# Patient Record
Sex: MALE | Race: WHITE | NOT HISPANIC OR LATINO | Employment: FULL TIME | URBAN - METROPOLITAN AREA
[De-identification: names, ages, dates, MRNs, and addresses within clinical notes are randomized per-mention and may not be internally consistent; named-entity substitution may affect disease eponyms.]

---

## 2017-03-06 ENCOUNTER — GENERIC CONVERSION - ENCOUNTER (OUTPATIENT)
Dept: OTHER | Facility: OTHER | Age: 52
End: 2017-03-06

## 2017-03-06 DIAGNOSIS — I10 ESSENTIAL (PRIMARY) HYPERTENSION: ICD-10-CM

## 2017-03-06 DIAGNOSIS — M10.9 GOUT: ICD-10-CM

## 2017-10-23 ENCOUNTER — ALLSCRIPTS OFFICE VISIT (OUTPATIENT)
Dept: OTHER | Facility: OTHER | Age: 52
End: 2017-10-23

## 2017-10-23 DIAGNOSIS — M25.531 PAIN IN RIGHT WRIST: ICD-10-CM

## 2017-10-23 DIAGNOSIS — I10 ESSENTIAL (PRIMARY) HYPERTENSION: ICD-10-CM

## 2017-10-23 DIAGNOSIS — E78.5 HYPERLIPIDEMIA: ICD-10-CM

## 2017-10-23 DIAGNOSIS — Z12.5 ENCOUNTER FOR SCREENING FOR MALIGNANT NEOPLASM OF PROSTATE: ICD-10-CM

## 2017-10-23 DIAGNOSIS — M10.9 GOUT: ICD-10-CM

## 2017-10-24 NOTE — PROGRESS NOTES
Assessment  1  Never a smoker  2  Prostate cancer screening (V76 44) (Z12 5)  3  Tendinitis of left rotator cuff (726 10) (M75 82)  4  Hyperlipidemia (272 4) (E78 5)  5  Hypertension (401 9) (I10)  6  Gout (274 9) (M10 9)  7  Right wrist pain (719 43) (M25 531)  8  Never a smoker    Plan  Gout    · (1) URIC ACID; Status:Active; Requested MZU:[id number];   Hyperlipidemia    · (1) LIPID PANEL, FASTING; Status:Active; Requested JASON:[id number];   Hypertension    · (1) COMPREHENSIVE METABOLIC PANEL; Status:Active; Requested XZS:[id number];   Prostate cancer screening    · (1) PSA (SCREEN) (Dx V76 44 Screen for Prostate Cancer); Status:Active; Requested  RXU:00KUT2444;   Right wrist pain    · * XR WRIST 3+ VIEW RIGHT; Status:Active; Requested KFF:92NUP9103;     Discussion/Summary    The patient appears to have rotator cuff tendinitis and I would not be surprised if he has something such as a labral tear of his left shoulder based on the amount of pain that he is having  After discussing risks and benefits, I injected 1 mL of 2% lidocaine and 0 5 mL of Kenalog 40 mg spinal  He tolerated the procedure well  It was done under aseptic conditions utilizing a posterior approach would like him to have some fasting labs done  He also complains of pain in his right wrist  Exam is relatively normal except for some pain with full flexion extension  Check an x-ray of the wrist       Chief Complaint  He is in today for left shoulder injection      History of Present Illness  Patient presents today for a follow-up for ongoing pain in his left shoulder  He [facility] years ago with some relief  The shoulder is quite painful with any type of abduction activities or if he is trying to do something such as riding a bike  He tries to avoid anti-inflammatories, as they have given him intermittent gastritis symptoms  has a history of hypertension and denies any current problems with chest pain, shortness of breath or palpitations  Review of Systems    Cardiovascular: no chest pain-- and-- no palpitations  Active Problems  1  Arthralgia (719 40) (M25 50)  2  Colon cancer screening (V76 51) (Z12 11)  3  Flu vaccine need (V04 81) (Z23)  4  Gout (274 9) (M10 9)  5  Hyperlipidemia (272 4) (E78 5)  6  Hypertension (401 9) (I10)  7  Tendinitis of left rotator cuff (726 10) (M75 82)    Past Medical History  1  History of Acute upper respiratory infection (465 9) (J06 9)  2  History of allergic reaction (V15 09) (Z88 9)  3  History of urticaria (V13 3) (Z87 2)  4  History of Muscle ache (729 1) (M79 1)    The active problems and past medical history were reviewed and updated today  Family History  Mother   1  Family history of hyperlipidemia (V18 19) (Z83 49)    The family history was reviewed and updated today  Social History   · Never a smoker   · Never a smoker  The social history was reviewed and updated today  Current Meds  1  Cyclobenzaprine HCl - 10 MG Oral Tablet; TAKE 1 TABLET AT BEDTIME AS NEEDED; Therapy: 37ZJP4994 to (467 20 767)  Requested for: 88YXS1289; Last   Rx:06Mar2017 Ordered  2  Lisinopril-Hydrochlorothiazide 10-12 5 MG Oral Tablet; TAKE 1 TABLET DAILY; Therapy: 87Ohy8980 to (Hortencia Freshwater)  Requested for: 07GUB8625; Last   Rx:06Mar2017 Ordered  3  Nabumetone 750 MG Oral Tablet; TAKE 1 TABLET TWICE DAILY AS NEEDED; Therapy: 26BZD9867 to (Evaluate:82Kjh8289)  Requested for: 95HMX6363; Last   Rx:32Wxx6612 Ordered    Allergies  1  No Known Drug Allergies    Vitals  Vital Signs    Recorded: 47CYB7296 12:29PM   Heart Rate 72   Systolic 510   Diastolic 80   Height 6 ft 9 8 in   Weight 185 lb 6 oz   BMI Calculated 19 48   BSA Calculated 2 26     Physical Exam    Constitutional   General appearance: No acute distress, well appearing and well nourished  Pulmonary   Respiratory effort: No increased work of breathing or signs of respiratory distress  Auscultation of lungs: Clear to auscultation, equal breath sounds bilaterally, no wheezes, no rales, no rhonci  Cardiovascular   Auscultation of heart: Normal rate and rhythm, normal S1 and S2, without murmurs  Examination of extremities for edema and/or varicosities: Normal          Results/Data  PHQ-2 Adult Depression Screening 23Oct2017 01:01PM User, Waldo     Test Name Result Flag Reference   PHQ-2 Adult Depression Score 0     Over the last two weeks, how often have you been bothered by any of the following problems? Little interest or pleasure in doing things: Not at all - 0  Feeling down, depressed, or hopeless: Not at all - 0   PHQ-2 Adult Depression Screening Negative         Health Management  Colon cancer screening   COLONOSCOPY (GI, SURG); every 10 years; Last 52XLU8557; Next Due: 21CWX5382;  Active    Signatures   Electronically signed by : RICARDO Miller ; Oct 23 2017  1:07PM EST                       (Author)    Electronically signed by : RICARDO Miller ; Oct 24 2017 11:54AM EST

## 2018-01-13 VITALS — DIASTOLIC BLOOD PRESSURE: 94 MMHG | SYSTOLIC BLOOD PRESSURE: 157 MMHG

## 2018-01-14 VITALS
WEIGHT: 185.38 LBS | BODY MASS INDEX: 21.45 KG/M2 | HEIGHT: 78 IN | SYSTOLIC BLOOD PRESSURE: 130 MMHG | DIASTOLIC BLOOD PRESSURE: 80 MMHG | HEART RATE: 72 BPM

## 2018-01-15 NOTE — PROGRESS NOTES
Assessment    1  Acute upper respiratory infection (465 9) (J06 9)   2  Allergic reaction (995 3) (T78 40XA)   3  Hives (708 9) (L50 9)   4  Hypertension (401 9) (I10)    Plan  Acute upper respiratory infection    · Benzonatate 200 MG Oral Capsule; TAKE 1 CAPSULE 3 TIMES DAILY AS  NEEDED  Allergic reaction    · Kenalog 40 MG/ML Injection Suspension (Triamcinolone Acetonide)  Allergic reaction, Hives    · PredniSONE 10 MG Oral Tablet; Take 5 pills daily for 2 days, 4 pills daily for 2 days,  3 pills daily for 2 days, 2 pills daily for 2 days, then 1 pill daily for 2 days  Hypertension    · AmLODIPine Besylate 10 MG Oral Tablet; TAKE 1 TABLET DAILY FOR BLOOD  PRESSURE    Discussion/Summary    The patient will use benzonatate as needed for cough  We discussed that it appears he has not respiratory infection which is likely viral as well as a rash which could be allergic reaction to an unknown source  He will restart a prednisone taper as above  He was also given an injection of Kenalog in the office given his lip swelling and tingling  He was encouraged to use Benadryl as needed for the itching  We reviewed that if he felt that the symptoms started to involve throat swelling or shortness of breath, he should call 911 and go to the emergency room immediately  He should call if his symptoms fail to improve  Chief Complaint  c/o ear pressure, coughing and hives      History of Present Illness  Cold Symptoms:   Em Downey presents with complaints of cold symptoms starting 2 days ago (just finished prednisone for his gout yesterday - denies trouble with allergies - notes that his lips feel a little tingling and swollen)   Associated symptoms include nasal congestion, runny nose, post nasal drainage, dry cough, facial pressure and plugged ear(s), but no ear pain, no wheezing, no nausea, no vomiting and no diarrhea     The patient presents with complaints of sore throat (resolved but began this morning)   The patient presents with complaints of shortness of breath (and chest tightness )   The patient presents with complaints of fever (woke up drenched the last 2 nights )      Review of Systems    Constitutional: fever  ENT: sore throat and nasal discharge, but no earache  Respiratory: shortness of breath and cough, but no wheezing  Gastrointestinal: no nausea, no vomiting and no diarrhea  Neurological: headache  Active Problems    1  Gout (274 9) (M10 9)    Current Meds   1  Lisinopril-Hydrochlorothiazide 10-12 5 MG Oral Tablet; Take 1 tablet daily; Therapy: 21Jan2016 to (Evaluate:66Zpr2132) Recorded   2  Nabumetone 750 MG Oral Tablet; TAKE 1 TABLET BY MOUTH TWICE A DAY AS   NEEDED FOR PAIN;   Therapy: 60FBN5945 to (Kirill Brice)  Requested for: 00BTL5067; Last   Rx:05Kfs5850 Ordered    Vitals   ** Printed in Appendix #1 below  Physical Exam    Constitutional   General appearance: No acute distress, well appearing and well nourished  Head and Face   Head and face: Normal     Eyes   Conjunctiva and lids: No erythema, swelling or discharge  Pupils and irises: Equal, round, reactive to light  Ears, Nose, Mouth, and Throat   External inspection of ears and nose: Normal     Otoscopic examination: Tympanic membranes translucent with normal light reflex  Canals patent without erythema  Hearing: Normal     Nasal mucosa, septum, and turbinates: Normal without edema or erythema  Lips, teeth, and gums: Normal, good dentition  Oropharynx: Normal with no erythema, edema, exudate or lesions  Neck   Neck: Supple, symmetric, trachea midline, no masses  Thyroid: Normal, no thyromegaly  Pulmonary   Respiratory effort: Abnormal   Respiratory rate: normal  Assessment of respiratory effort revealed normal rhythm and effort  Respiratory Findings: dry cough and bronchial cough  Auscultation of lungs: Clear to auscultation      Cardiovascular   Auscultation of heart: Normal rate and rhythm, normal S1 and S2, no murmurs  Peripheral vascular exam: Normal   Radial: right 2+ and left 2+  Lymphatic   Palpation of lymph nodes in neck: Abnormal   right non-tender anterior cervical node enlargement, but no posterior cervical node enlargement and no submandibular node enlargement  Skin   Skin and subcutaneous tissue: Abnormal   scattered urticaria on the upper extremities bilaterally and anterior waistline  Psychiatric   Mood and affect: Normal        Attending Note  Attending Note [facility]: Comments/Additional Findings: I did end up switching the patient to amlodipine 10 mg daily instead lisinopril/hydrochlorothiazide in case he was experiencing angioedema  I spoke with him 16 and he is doing much better        Signatures   Electronically signed by : JANEL River; 2016 11:08PM EST                       (Author)    Electronically signed by : RICARDO Thurman ; 2016  4:30PM EST                       (Author)    Appendix #1     Patient: Juan Francisco Gatica ; : 1965; MRN: [id number]      Recorded: 28JWY3402 06:28PM Recorded: 03XZV2982 06:09PM Recorded: 87IAI9991 05:55PM   Temperature   98 2 F   Heart Rate   797   Systolic 008 594 427   Diastolic 92 90 397   Height   6 ft 9 8 in   Weight   189 lb 8 0 oz   BMI Calculated   19 91   BSA Calculated   2 29   O2 Saturation   98

## 2018-01-17 NOTE — MISCELLANEOUS
Message   Recorded as Task   Date: 03/08/2016 06:54 PM, Created By: Radha Whitley   Task Name: Follow Up   Assigned To: Radha Whitley   Regarding Patient: Robert Rai, Status: Active   Comment:    Radha Whitley - 08 Mar 2016 6:54 PM     TASK CREATED  DMD requested that I call in med to [facility] for flexaril 10mg 1 qhs prn 30 and 1 r, this was done  Active Problems    1  Acute upper respiratory infection (465 9) (J06 9)   2  Allergic reaction (995 3) (T78 40XA)   3  Gout (274 9) (M10 9)   4  Hives (708 9) (L50 9)   5  Hypertension (401 9) (I10)   6  Muscle ache (729 1) (M79 1)    Current Meds   1  AmLODIPine Besylate 10 MG Oral Tablet; TAKE 1 TABLET DAILY FOR BLOOD   PRESSURE; Therapy: 69TVK1766 to (Evaluate:89Etm6335)  Requested for: 68CIC5882; Last   Rx:22Jan2016 Ordered   2  Cyclobenzaprine HCl - 10 MG Oral Tablet; TAKE 1 TABLET AT BEDTIME AS NEEDED; Therapy: 15BYT6538 to (Walker Bui) Recorded   3  Nabumetone 750 MG Oral Tablet; TAKE 1 TABLET BY MOUTH TWICE A DAY AS   NEEDED FOR PAIN;   Therapy: 66RYX6398 to (Conchetta Pop)  Requested for: 14AQT9708; Last   Rx:36Pno9801 Ordered    Allergies    1   No Known Drug Allergies    Signatures   Electronically signed by : Renae Bess, ; Mar  8 2016  6:54PM EST                       (Author)

## 2018-01-26 DIAGNOSIS — N52.9 ERECTILE DYSFUNCTION, UNSPECIFIED ERECTILE DYSFUNCTION TYPE: Primary | ICD-10-CM

## 2018-01-26 RX ORDER — SILDENAFIL 100 MG/1
100 TABLET, FILM COATED ORAL DAILY PRN
Qty: 6 TABLET | Refills: 0 | Status: SHIPPED | OUTPATIENT
Start: 2018-01-26 | End: 2018-04-19 | Stop reason: SDUPTHER

## 2018-03-18 DIAGNOSIS — I10 ESSENTIAL HYPERTENSION: Primary | ICD-10-CM

## 2018-03-19 RX ORDER — LISINOPRIL AND HYDROCHLOROTHIAZIDE 12.5; 1 MG/1; MG/1
TABLET ORAL
Qty: 90 TABLET | Refills: 3 | Status: SHIPPED | OUTPATIENT
Start: 2018-03-19 | End: 2018-12-26

## 2018-04-19 DIAGNOSIS — N52.9 ERECTILE DYSFUNCTION, UNSPECIFIED ERECTILE DYSFUNCTION TYPE: ICD-10-CM

## 2018-04-19 RX ORDER — SILDENAFIL 100 MG/1
100 TABLET, FILM COATED ORAL DAILY PRN
Qty: 6 TABLET | Refills: 0 | Status: SHIPPED | OUTPATIENT
Start: 2018-04-19 | End: 2018-11-20 | Stop reason: SDUPTHER

## 2018-11-20 DIAGNOSIS — N52.9 ERECTILE DYSFUNCTION, UNSPECIFIED ERECTILE DYSFUNCTION TYPE: ICD-10-CM

## 2018-11-20 RX ORDER — SILDENAFIL 100 MG/1
100 TABLET, FILM COATED ORAL DAILY PRN
Qty: 6 TABLET | Refills: 3 | Status: SHIPPED | OUTPATIENT
Start: 2018-11-20 | End: 2020-07-31 | Stop reason: SDUPTHER

## 2018-12-26 DIAGNOSIS — R45.4 ANGER REACTION: Primary | ICD-10-CM

## 2018-12-26 RX ORDER — ESCITALOPRAM OXALATE 10 MG/1
10 TABLET ORAL DAILY
Qty: 90 TABLET | Refills: 1 | Status: SHIPPED | OUTPATIENT
Start: 2018-12-26 | End: 2019-04-05

## 2018-12-26 NOTE — PROGRESS NOTES
Pt experiencing some increased anger during holidays regarding his divorce etc  Will try lexapro 10 mg QD  He needs a follow up in the office as well, which was discussed

## 2019-04-02 DIAGNOSIS — I10 ESSENTIAL HYPERTENSION: Primary | ICD-10-CM

## 2019-04-02 RX ORDER — LISINOPRIL AND HYDROCHLOROTHIAZIDE 12.5; 1 MG/1; MG/1
TABLET ORAL
Qty: 90 TABLET | Refills: 1 | Status: SHIPPED | OUTPATIENT
Start: 2019-04-02 | End: 2019-05-31 | Stop reason: SDUPTHER

## 2019-04-05 ENCOUNTER — OFFICE VISIT (OUTPATIENT)
Dept: FAMILY MEDICINE CLINIC | Facility: CLINIC | Age: 54
End: 2019-04-05
Payer: COMMERCIAL

## 2019-04-05 ENCOUNTER — APPOINTMENT (OUTPATIENT)
Dept: LAB | Facility: CLINIC | Age: 54
End: 2019-04-05
Payer: COMMERCIAL

## 2019-04-05 VITALS
WEIGHT: 203.6 LBS | SYSTOLIC BLOOD PRESSURE: 138 MMHG | BODY MASS INDEX: 28.5 KG/M2 | OXYGEN SATURATION: 97 % | TEMPERATURE: 97.4 F | DIASTOLIC BLOOD PRESSURE: 86 MMHG | HEIGHT: 71 IN | HEART RATE: 68 BPM | RESPIRATION RATE: 18 BRPM

## 2019-04-05 DIAGNOSIS — Z12.5 PROSTATE CANCER SCREENING: ICD-10-CM

## 2019-04-05 DIAGNOSIS — E78.00 PURE HYPERCHOLESTEROLEMIA: ICD-10-CM

## 2019-04-05 DIAGNOSIS — I10 ESSENTIAL HYPERTENSION: ICD-10-CM

## 2019-04-05 DIAGNOSIS — R53.83 OTHER FATIGUE: ICD-10-CM

## 2019-04-05 DIAGNOSIS — N52.9 ERECTILE DYSFUNCTION, UNSPECIFIED ERECTILE DYSFUNCTION TYPE: ICD-10-CM

## 2019-04-05 DIAGNOSIS — M10.00 IDIOPATHIC GOUT, UNSPECIFIED CHRONICITY, UNSPECIFIED SITE: ICD-10-CM

## 2019-04-05 DIAGNOSIS — N52.9 ERECTILE DYSFUNCTION, UNSPECIFIED ERECTILE DYSFUNCTION TYPE: Primary | ICD-10-CM

## 2019-04-05 PROBLEM — E78.5 HYPERLIPIDEMIA: Status: ACTIVE | Noted: 2017-03-06

## 2019-04-05 PROBLEM — M75.82 TENDINITIS OF LEFT ROTATOR CUFF: Status: ACTIVE | Noted: 2017-03-06

## 2019-04-05 LAB
ALBUMIN SERPL BCP-MCNC: 4.3 G/DL (ref 3.5–5)
ALP SERPL-CCNC: 71 U/L (ref 46–116)
ALT SERPL W P-5'-P-CCNC: 38 U/L (ref 12–78)
ANION GAP SERPL CALCULATED.3IONS-SCNC: 5 MMOL/L (ref 4–13)
AST SERPL W P-5'-P-CCNC: 23 U/L (ref 5–45)
BASOPHILS # BLD AUTO: 0.06 THOUSANDS/ΜL (ref 0–0.1)
BASOPHILS NFR BLD AUTO: 1 % (ref 0–1)
BILIRUB SERPL-MCNC: 0.67 MG/DL (ref 0.2–1)
BUN SERPL-MCNC: 24 MG/DL (ref 5–25)
CALCIUM SERPL-MCNC: 9.5 MG/DL (ref 8.3–10.1)
CHLORIDE SERPL-SCNC: 103 MMOL/L (ref 100–108)
CHOLEST SERPL-MCNC: 282 MG/DL (ref 50–200)
CO2 SERPL-SCNC: 30 MMOL/L (ref 21–32)
CREAT SERPL-MCNC: 1.28 MG/DL (ref 0.6–1.3)
EOSINOPHIL # BLD AUTO: 0.2 THOUSAND/ΜL (ref 0–0.61)
EOSINOPHIL NFR BLD AUTO: 3 % (ref 0–6)
ERYTHROCYTE [DISTWIDTH] IN BLOOD BY AUTOMATED COUNT: 12.7 % (ref 11.6–15.1)
GFR SERPL CREATININE-BSD FRML MDRD: 63 ML/MIN/1.73SQ M
GLUCOSE P FAST SERPL-MCNC: 105 MG/DL (ref 65–99)
HCT VFR BLD AUTO: 50.8 % (ref 36.5–49.3)
HDLC SERPL-MCNC: 39 MG/DL (ref 40–60)
HGB BLD-MCNC: 16.8 G/DL (ref 12–17)
IMM GRANULOCYTES # BLD AUTO: 0.04 THOUSAND/UL (ref 0–0.2)
IMM GRANULOCYTES NFR BLD AUTO: 1 % (ref 0–2)
LDLC SERPL CALC-MCNC: 202 MG/DL (ref 0–100)
LYMPHOCYTES # BLD AUTO: 2.05 THOUSANDS/ΜL (ref 0.6–4.47)
LYMPHOCYTES NFR BLD AUTO: 29 % (ref 14–44)
MCH RBC QN AUTO: 29.7 PG (ref 26.8–34.3)
MCHC RBC AUTO-ENTMCNC: 33.1 G/DL (ref 31.4–37.4)
MCV RBC AUTO: 90 FL (ref 82–98)
MONOCYTES # BLD AUTO: 1 THOUSAND/ΜL (ref 0.17–1.22)
MONOCYTES NFR BLD AUTO: 14 % (ref 4–12)
NEUTROPHILS # BLD AUTO: 3.65 THOUSANDS/ΜL (ref 1.85–7.62)
NEUTS SEG NFR BLD AUTO: 52 % (ref 43–75)
NRBC BLD AUTO-RTO: 0 /100 WBCS
PLATELET # BLD AUTO: 254 THOUSANDS/UL (ref 149–390)
PMV BLD AUTO: 10.1 FL (ref 8.9–12.7)
POTASSIUM SERPL-SCNC: 4.1 MMOL/L (ref 3.5–5.3)
PROT SERPL-MCNC: 8 G/DL (ref 6.4–8.2)
PSA SERPL-MCNC: 2.1 NG/ML (ref 0–4)
RBC # BLD AUTO: 5.66 MILLION/UL (ref 3.88–5.62)
SODIUM SERPL-SCNC: 138 MMOL/L (ref 136–145)
TESTOST SERPL-MCNC: 554 NG/DL (ref 95–948)
TRIGL SERPL-MCNC: 207 MG/DL
TSH SERPL DL<=0.05 MIU/L-ACNC: 3.55 UIU/ML (ref 0.36–3.74)
URATE SERPL-MCNC: 8.3 MG/DL (ref 4.2–8)
WBC # BLD AUTO: 7 THOUSAND/UL (ref 4.31–10.16)

## 2019-04-05 PROCEDURE — 3075F SYST BP GE 130 - 139MM HG: CPT | Performed by: FAMILY MEDICINE

## 2019-04-05 PROCEDURE — 3008F BODY MASS INDEX DOCD: CPT | Performed by: FAMILY MEDICINE

## 2019-04-05 PROCEDURE — 99213 OFFICE O/P EST LOW 20 MIN: CPT | Performed by: FAMILY MEDICINE

## 2019-04-05 PROCEDURE — 85025 COMPLETE CBC W/AUTO DIFF WBC: CPT

## 2019-04-05 PROCEDURE — 84443 ASSAY THYROID STIM HORMONE: CPT

## 2019-04-05 PROCEDURE — 3079F DIAST BP 80-89 MM HG: CPT | Performed by: FAMILY MEDICINE

## 2019-04-05 PROCEDURE — G0103 PSA SCREENING: HCPCS

## 2019-04-05 PROCEDURE — 84403 ASSAY OF TOTAL TESTOSTERONE: CPT

## 2019-04-05 PROCEDURE — 80061 LIPID PANEL: CPT

## 2019-04-05 PROCEDURE — 80053 COMPREHEN METABOLIC PANEL: CPT

## 2019-04-05 PROCEDURE — 84550 ASSAY OF BLOOD/URIC ACID: CPT

## 2019-04-05 PROCEDURE — 36415 COLL VENOUS BLD VENIPUNCTURE: CPT

## 2019-04-08 DIAGNOSIS — E78.00 PURE HYPERCHOLESTEROLEMIA: Primary | ICD-10-CM

## 2019-04-08 RX ORDER — ATORVASTATIN CALCIUM 20 MG/1
20 TABLET, FILM COATED ORAL DAILY
Qty: 90 TABLET | Refills: 3 | Status: SHIPPED | OUTPATIENT
Start: 2019-04-08 | End: 2020-04-24

## 2019-05-31 ENCOUNTER — OFFICE VISIT (OUTPATIENT)
Dept: FAMILY MEDICINE CLINIC | Facility: CLINIC | Age: 54
End: 2019-05-31
Payer: COMMERCIAL

## 2019-05-31 ENCOUNTER — TRANSCRIBE ORDERS (OUTPATIENT)
Dept: ADMINISTRATIVE | Facility: HOSPITAL | Age: 54
End: 2019-05-31

## 2019-05-31 ENCOUNTER — HOSPITAL ENCOUNTER (OUTPATIENT)
Dept: ULTRASOUND IMAGING | Facility: HOSPITAL | Age: 54
Discharge: HOME | End: 2019-05-31
Payer: COMMERCIAL

## 2019-05-31 ENCOUNTER — APPOINTMENT (OUTPATIENT)
Dept: LAB | Facility: HOSPITAL | Age: 54
End: 2019-05-31
Payer: COMMERCIAL

## 2019-05-31 VITALS
WEIGHT: 205 LBS | HEART RATE: 70 BPM | DIASTOLIC BLOOD PRESSURE: 82 MMHG | SYSTOLIC BLOOD PRESSURE: 120 MMHG | BODY MASS INDEX: 28.7 KG/M2 | HEIGHT: 71 IN

## 2019-05-31 DIAGNOSIS — R10.11 RUQ PAIN: ICD-10-CM

## 2019-05-31 DIAGNOSIS — R31.0 GROSS HEMATURIA: ICD-10-CM

## 2019-05-31 DIAGNOSIS — R10.11 RUQ PAIN: Primary | ICD-10-CM

## 2019-05-31 DIAGNOSIS — E66.3 OVERWEIGHT WITH BODY MASS INDEX (BMI) 25.0-29.9: ICD-10-CM

## 2019-05-31 DIAGNOSIS — I10 ESSENTIAL HYPERTENSION: ICD-10-CM

## 2019-05-31 LAB
ALBUMIN SERPL BCP-MCNC: 4.7 G/DL (ref 3–5.2)
ALP SERPL-CCNC: 65 U/L (ref 43–122)
ALT SERPL W P-5'-P-CCNC: 52 U/L (ref 9–52)
AMYLASE SERPL-CCNC: 76 IU/L (ref 30–110)
ANION GAP SERPL CALCULATED.3IONS-SCNC: 8 MMOL/L (ref 5–14)
AST SERPL W P-5'-P-CCNC: 38 U/L (ref 17–59)
BILIRUB SERPL-MCNC: 0.8 MG/DL
BUN SERPL-MCNC: 21 MG/DL (ref 5–25)
CALCIUM ALBUM COR SERPL-MCNC: 9.9 MG/DL (ref 8.3–10.1)
CALCIUM SERPL-MCNC: 10.5 MG/DL (ref 8.4–10.2)
CHLORIDE SERPL-SCNC: 100 MMOL/L (ref 97–108)
CO2 SERPL-SCNC: 30 MMOL/L (ref 22–30)
CREAT SERPL-MCNC: 1.09 MG/DL (ref 0.7–1.5)
EOSINOPHIL # BLD AUTO: 0.12 THOUSAND/UL (ref 0–0.4)
EOSINOPHIL NFR BLD MANUAL: 2 % (ref 0–6)
ERYTHROCYTE [DISTWIDTH] IN BLOOD BY AUTOMATED COUNT: 12.9 %
GFR SERPL CREATININE-BSD FRML MDRD: 76 ML/MIN/1.73SQ M
GLUCOSE P FAST SERPL-MCNC: 99 MG/DL (ref 70–99)
HCT VFR BLD AUTO: 45.1 % (ref 41–53)
HGB BLD-MCNC: 15.5 G/DL (ref 13.5–17.5)
LYMPHOCYTES # BLD AUTO: 1.95 THOUSAND/UL (ref 0.5–4)
LYMPHOCYTES # BLD AUTO: 32 % (ref 25–45)
MCH RBC QN AUTO: 30.1 PG (ref 26–34)
MCHC RBC AUTO-ENTMCNC: 34.5 G/DL (ref 31–36)
MCV RBC AUTO: 87 FL (ref 80–100)
MONOCYTES # BLD AUTO: 0.85 THOUSAND/UL (ref 0.2–0.9)
MONOCYTES NFR BLD AUTO: 14 % (ref 1–10)
NEUTS SEG # BLD: 3.17 THOUSAND/UL (ref 1.8–7.8)
NEUTS SEG NFR BLD AUTO: 52 %
PLATELET # BLD AUTO: 239 THOUSANDS/UL (ref 150–450)
PLATELET BLD QL SMEAR: ADEQUATE
PMV BLD AUTO: 8 FL (ref 8.9–12.7)
POTASSIUM SERPL-SCNC: 4.3 MMOL/L (ref 3.6–5)
PROT SERPL-MCNC: 7.5 G/DL (ref 5.9–8.4)
RBC # BLD AUTO: 5.17 MILLION/UL (ref 4.5–5.9)
RBC MORPH BLD: NORMAL
SL AMB  POCT GLUCOSE, UA: NEGATIVE
SL AMB LEUKOCYTE ESTERASE,UA: NEGATIVE
SL AMB POCT BILIRUBIN,UA: NEGATIVE
SL AMB POCT BLOOD,UA: NEGATIVE
SL AMB POCT CLARITY,UA: CLEAR
SL AMB POCT COLOR,UA: YELLOW
SL AMB POCT KETONES,UA: NEGATIVE
SL AMB POCT NITRITE,UA: NEGATIVE
SL AMB POCT PH,UA: 5
SL AMB POCT SPECIFIC GRAVITY,UA: 1.02
SL AMB POCT URINE PROTEIN: NEGATIVE
SL AMB POCT UROBILINOGEN: 0.2
SODIUM SERPL-SCNC: 138 MMOL/L (ref 137–147)
TOTAL CELLS COUNTED SPEC: 100
WBC # BLD AUTO: 6.1 THOUSAND/UL (ref 4.5–11)

## 2019-05-31 PROCEDURE — 76700 US EXAM ABDOM COMPLETE: CPT

## 2019-05-31 PROCEDURE — 82150 ASSAY OF AMYLASE: CPT

## 2019-05-31 PROCEDURE — 3008F BODY MASS INDEX DOCD: CPT | Performed by: FAMILY MEDICINE

## 2019-05-31 PROCEDURE — 99213 OFFICE O/P EST LOW 20 MIN: CPT | Performed by: FAMILY MEDICINE

## 2019-05-31 PROCEDURE — 81002 URINALYSIS NONAUTO W/O SCOPE: CPT | Performed by: FAMILY MEDICINE

## 2019-05-31 PROCEDURE — 85027 COMPLETE CBC AUTOMATED: CPT

## 2019-05-31 PROCEDURE — 3074F SYST BP LT 130 MM HG: CPT | Performed by: FAMILY MEDICINE

## 2019-05-31 PROCEDURE — 36415 COLL VENOUS BLD VENIPUNCTURE: CPT

## 2019-05-31 PROCEDURE — 80053 COMPREHEN METABOLIC PANEL: CPT

## 2019-05-31 PROCEDURE — 85007 BL SMEAR W/DIFF WBC COUNT: CPT

## 2019-05-31 PROCEDURE — 3079F DIAST BP 80-89 MM HG: CPT | Performed by: FAMILY MEDICINE

## 2019-05-31 RX ORDER — LISINOPRIL AND HYDROCHLOROTHIAZIDE 12.5; 1 MG/1; MG/1
1 TABLET ORAL DAILY
Qty: 90 TABLET | Refills: 3 | Status: SHIPPED | OUTPATIENT
Start: 2019-05-31 | End: 2020-04-16 | Stop reason: SINTOL

## 2019-06-13 ENCOUNTER — CONSULT (OUTPATIENT)
Dept: UROLOGY | Facility: MEDICAL CENTER | Age: 54
End: 2019-06-13
Payer: COMMERCIAL

## 2019-06-13 VITALS
HEIGHT: 70 IN | HEART RATE: 74 BPM | SYSTOLIC BLOOD PRESSURE: 120 MMHG | BODY MASS INDEX: 27.92 KG/M2 | WEIGHT: 195 LBS | DIASTOLIC BLOOD PRESSURE: 74 MMHG

## 2019-06-13 DIAGNOSIS — R36.9: ICD-10-CM

## 2019-06-13 DIAGNOSIS — N40.1 BPH WITH OBSTRUCTION/LOWER URINARY TRACT SYMPTOMS: ICD-10-CM

## 2019-06-13 DIAGNOSIS — R31.0 GROSS HEMATURIA: Primary | ICD-10-CM

## 2019-06-13 DIAGNOSIS — N20.0 CALCULUS OF KIDNEY: ICD-10-CM

## 2019-06-13 DIAGNOSIS — N13.8 BPH WITH OBSTRUCTION/LOWER URINARY TRACT SYMPTOMS: ICD-10-CM

## 2019-06-13 LAB
SL AMB  POCT GLUCOSE, UA: NEGATIVE
SL AMB LEUKOCYTE ESTERASE,UA: NEGATIVE
SL AMB POCT BILIRUBIN,UA: NEGATIVE
SL AMB POCT BLOOD,UA: NEGATIVE
SL AMB POCT CLARITY,UA: CLEAR
SL AMB POCT COLOR,UA: YELLOW
SL AMB POCT KETONES,UA: NEGATIVE
SL AMB POCT NITRITE,UA: NEGATIVE
SL AMB POCT PH,UA: 5.5
SL AMB POCT SPECIFIC GRAVITY,UA: 1.02
SL AMB POCT URINE PROTEIN: NEGATIVE
SL AMB POCT UROBILINOGEN: 0.2

## 2019-06-13 PROCEDURE — 81003 URINALYSIS AUTO W/O SCOPE: CPT | Performed by: UROLOGY

## 2019-06-13 PROCEDURE — 99203 OFFICE O/P NEW LOW 30 MIN: CPT | Performed by: UROLOGY

## 2019-07-12 ENCOUNTER — OFFICE VISIT (OUTPATIENT)
Dept: FAMILY MEDICINE CLINIC | Facility: CLINIC | Age: 54
End: 2019-07-12
Payer: COMMERCIAL

## 2019-07-12 VITALS
WEIGHT: 206 LBS | HEART RATE: 76 BPM | OXYGEN SATURATION: 97 % | HEIGHT: 70 IN | DIASTOLIC BLOOD PRESSURE: 80 MMHG | RESPIRATION RATE: 18 BRPM | SYSTOLIC BLOOD PRESSURE: 124 MMHG | BODY MASS INDEX: 29.49 KG/M2

## 2019-07-12 DIAGNOSIS — I10 ESSENTIAL HYPERTENSION: Primary | ICD-10-CM

## 2019-07-12 DIAGNOSIS — E66.3 OVERWEIGHT WITH BODY MASS INDEX (BMI) 25.0-29.9: ICD-10-CM

## 2019-07-12 DIAGNOSIS — E78.00 PURE HYPERCHOLESTEROLEMIA: ICD-10-CM

## 2019-07-12 DIAGNOSIS — M10.00 IDIOPATHIC GOUT, UNSPECIFIED CHRONICITY, UNSPECIFIED SITE: ICD-10-CM

## 2019-07-12 PROBLEM — R36.9: Status: RESOLVED | Noted: 2019-06-13 | Resolved: 2019-07-12

## 2019-07-12 PROBLEM — M75.40 IMPINGEMENT SYNDROME OF SHOULDER REGION: Status: ACTIVE | Noted: 2019-07-12

## 2019-07-12 PROCEDURE — 3074F SYST BP LT 130 MM HG: CPT | Performed by: FAMILY MEDICINE

## 2019-07-12 PROCEDURE — 3008F BODY MASS INDEX DOCD: CPT | Performed by: FAMILY MEDICINE

## 2019-07-12 PROCEDURE — 1036F TOBACCO NON-USER: CPT | Performed by: FAMILY MEDICINE

## 2019-07-12 PROCEDURE — 99213 OFFICE O/P EST LOW 20 MIN: CPT | Performed by: FAMILY MEDICINE

## 2019-07-12 NOTE — PROGRESS NOTES
Assessment/Plan:       Problem List Items Addressed This Visit        Cardiovascular and Mediastinum    Hypertension - Primary    Relevant Orders    Lipid Panel with Direct LDL reflex    Comprehensive metabolic panel       Other    Gout    Relevant Orders    Uric acid    Pure hypercholesterolemia      Other Visit Diagnoses     Overweight with body mass index (BMI) 25 0-29 9              BMI Counseling: Body mass index is 29 56 kg/m²  Discussed the patient's BMI with him  The BMI is above average  BMI counseling and education was provided to the patient  Nutrition recommendations include reducing portion sizes, decreasing overall calorie intake and 3-5 servings of fruits/vegetables daily  Exercise recommendations include moderate aerobic physical activity for 150 minutes/week  He has appears to be a small skin tag behind his left knee  We can always removed this of a continues to bother him  Subjective:      Patient ID: Festus Art is a 52 y o  male  HPI The patient presents today for a hypertension follow-up  Patient remains compliant with medications and denies any chest pain, shortness of breath, palpitations, lightheadedness or diaphoresis  He tolerates atorvastatin without myalgias      The following portions of the patient's history were reviewed and updated as appropriate: allergies, current medications, past family history, past medical history, past social history, past surgical history and problem list       Current Outpatient Medications:     atorvastatin (LIPITOR) 20 mg tablet, Take 1 tablet (20 mg total) by mouth daily, Disp: 90 tablet, Rfl: 3    lisinopril-hydrochlorothiazide (PRINZIDE,ZESTORETIC) 10-12 5 MG per tablet, Take 1 tablet by mouth daily, Disp: 90 tablet, Rfl: 3    sildenafil (VIAGRA) 100 mg tablet, Take 1 tablet (100 mg total) by mouth daily as needed for erectile dysfunction (1/2 to 1 full tab daily p r n ), Disp: 6 tablet, Rfl: 3     Review of Systems Objective:      /80   Pulse 76   Resp 18   Ht 5' 10" (1 778 m)   Wt 93 4 kg (206 lb)   SpO2 97%   BMI 29 56 kg/m²          Physical Exam      Lisa Roach MD

## 2019-07-12 NOTE — PATIENT INSTRUCTIONS
Hypertension  Well controlled continue current regimen    Pure hypercholesterolemia  Check lipids in October with a CMP

## 2019-11-13 ENCOUNTER — IMMUNIZATIONS (OUTPATIENT)
Dept: FAMILY MEDICINE CLINIC | Facility: CLINIC | Age: 54
End: 2019-11-13
Payer: COMMERCIAL

## 2019-11-13 DIAGNOSIS — Z23 NEEDS FLU SHOT: Primary | ICD-10-CM

## 2019-11-13 PROCEDURE — 90682 RIV4 VACC RECOMBINANT DNA IM: CPT

## 2019-11-13 PROCEDURE — 90471 IMMUNIZATION ADMIN: CPT

## 2020-01-17 ENCOUNTER — OFFICE VISIT (OUTPATIENT)
Dept: FAMILY MEDICINE CLINIC | Facility: CLINIC | Age: 55
End: 2020-01-17
Payer: COMMERCIAL

## 2020-01-17 ENCOUNTER — APPOINTMENT (OUTPATIENT)
Dept: LAB | Facility: CLINIC | Age: 55
End: 2020-01-17
Payer: COMMERCIAL

## 2020-01-17 VITALS
DIASTOLIC BLOOD PRESSURE: 84 MMHG | TEMPERATURE: 96.7 F | BODY MASS INDEX: 29.42 KG/M2 | SYSTOLIC BLOOD PRESSURE: 120 MMHG | HEART RATE: 80 BPM | RESPIRATION RATE: 18 BRPM | WEIGHT: 205.5 LBS | OXYGEN SATURATION: 97 % | HEIGHT: 70 IN

## 2020-01-17 DIAGNOSIS — E78.00 PURE HYPERCHOLESTEROLEMIA: ICD-10-CM

## 2020-01-17 DIAGNOSIS — N13.8 BPH WITH OBSTRUCTION/LOWER URINARY TRACT SYMPTOMS: ICD-10-CM

## 2020-01-17 DIAGNOSIS — Z11.59 NEED FOR HEPATITIS C SCREENING TEST: ICD-10-CM

## 2020-01-17 DIAGNOSIS — N52.9 ERECTILE DYSFUNCTION, UNSPECIFIED ERECTILE DYSFUNCTION TYPE: ICD-10-CM

## 2020-01-17 DIAGNOSIS — Z23 NEED FOR TDAP VACCINATION: ICD-10-CM

## 2020-01-17 DIAGNOSIS — K76.0 FATTY LIVER: ICD-10-CM

## 2020-01-17 DIAGNOSIS — M10.00 IDIOPATHIC GOUT, UNSPECIFIED CHRONICITY, UNSPECIFIED SITE: ICD-10-CM

## 2020-01-17 DIAGNOSIS — I10 ESSENTIAL HYPERTENSION: ICD-10-CM

## 2020-01-17 DIAGNOSIS — M65.311 TRIGGER FINGER OF RIGHT THUMB: ICD-10-CM

## 2020-01-17 DIAGNOSIS — N40.1 BPH WITH OBSTRUCTION/LOWER URINARY TRACT SYMPTOMS: ICD-10-CM

## 2020-01-17 DIAGNOSIS — E66.3 OVERWEIGHT WITH BODY MASS INDEX (BMI) 25.0-29.9: ICD-10-CM

## 2020-01-17 DIAGNOSIS — I10 ESSENTIAL HYPERTENSION: Primary | ICD-10-CM

## 2020-01-17 DIAGNOSIS — Z11.4 SCREENING FOR HIV (HUMAN IMMUNODEFICIENCY VIRUS): ICD-10-CM

## 2020-01-17 LAB
ALBUMIN SERPL BCP-MCNC: 4.3 G/DL (ref 3.5–5)
ALP SERPL-CCNC: 75 U/L (ref 46–116)
ALT SERPL W P-5'-P-CCNC: 61 U/L (ref 12–78)
ANION GAP SERPL CALCULATED.3IONS-SCNC: 5 MMOL/L (ref 4–13)
AST SERPL W P-5'-P-CCNC: 34 U/L (ref 5–45)
BASOPHILS # BLD AUTO: 0.03 THOUSANDS/ΜL (ref 0–0.1)
BASOPHILS NFR BLD AUTO: 1 % (ref 0–1)
BILIRUB SERPL-MCNC: 0.84 MG/DL (ref 0.2–1)
BUN SERPL-MCNC: 18 MG/DL (ref 5–25)
CALCIUM SERPL-MCNC: 10 MG/DL (ref 8.3–10.1)
CHLORIDE SERPL-SCNC: 106 MMOL/L (ref 100–108)
CHOLEST SERPL-MCNC: 192 MG/DL (ref 50–200)
CO2 SERPL-SCNC: 29 MMOL/L (ref 21–32)
CREAT SERPL-MCNC: 1.18 MG/DL (ref 0.6–1.3)
EOSINOPHIL # BLD AUTO: 0.2 THOUSAND/ΜL (ref 0–0.61)
EOSINOPHIL NFR BLD AUTO: 3 % (ref 0–6)
ERYTHROCYTE [DISTWIDTH] IN BLOOD BY AUTOMATED COUNT: 12.5 % (ref 11.6–15.1)
GFR SERPL CREATININE-BSD FRML MDRD: 69 ML/MIN/1.73SQ M
GLUCOSE P FAST SERPL-MCNC: 94 MG/DL (ref 65–99)
HCT VFR BLD AUTO: 46.7 % (ref 36.5–49.3)
HCV AB SER QL: NORMAL
HDLC SERPL-MCNC: 42 MG/DL
HGB BLD-MCNC: 15.7 G/DL (ref 12–17)
IMM GRANULOCYTES # BLD AUTO: 0.02 THOUSAND/UL (ref 0–0.2)
IMM GRANULOCYTES NFR BLD AUTO: 0 % (ref 0–2)
LDLC SERPL CALC-MCNC: 84 MG/DL (ref 0–100)
LYMPHOCYTES # BLD AUTO: 1.52 THOUSANDS/ΜL (ref 0.6–4.47)
LYMPHOCYTES NFR BLD AUTO: 23 % (ref 14–44)
MCH RBC QN AUTO: 29.5 PG (ref 26.8–34.3)
MCHC RBC AUTO-ENTMCNC: 33.6 G/DL (ref 31.4–37.4)
MCV RBC AUTO: 88 FL (ref 82–98)
MONOCYTES # BLD AUTO: 0.65 THOUSAND/ΜL (ref 0.17–1.22)
MONOCYTES NFR BLD AUTO: 10 % (ref 4–12)
NEUTROPHILS # BLD AUTO: 4.07 THOUSANDS/ΜL (ref 1.85–7.62)
NEUTS SEG NFR BLD AUTO: 63 % (ref 43–75)
NRBC BLD AUTO-RTO: 0 /100 WBCS
PLATELET # BLD AUTO: 223 THOUSANDS/UL (ref 149–390)
PMV BLD AUTO: 10.1 FL (ref 8.9–12.7)
POTASSIUM SERPL-SCNC: 4 MMOL/L (ref 3.5–5.3)
PROT SERPL-MCNC: 7.7 G/DL (ref 6.4–8.2)
RBC # BLD AUTO: 5.33 MILLION/UL (ref 3.88–5.62)
SODIUM SERPL-SCNC: 140 MMOL/L (ref 136–145)
TRIGL SERPL-MCNC: 329 MG/DL
URATE SERPL-MCNC: 7 MG/DL (ref 4.2–8)
WBC # BLD AUTO: 6.49 THOUSAND/UL (ref 4.31–10.16)

## 2020-01-17 PROCEDURE — 84550 ASSAY OF BLOOD/URIC ACID: CPT

## 2020-01-17 PROCEDURE — 85025 COMPLETE CBC W/AUTO DIFF WBC: CPT

## 2020-01-17 PROCEDURE — 86803 HEPATITIS C AB TEST: CPT

## 2020-01-17 PROCEDURE — 90471 IMMUNIZATION ADMIN: CPT | Performed by: FAMILY MEDICINE

## 2020-01-17 PROCEDURE — 3079F DIAST BP 80-89 MM HG: CPT | Performed by: FAMILY MEDICINE

## 2020-01-17 PROCEDURE — 3074F SYST BP LT 130 MM HG: CPT | Performed by: FAMILY MEDICINE

## 2020-01-17 PROCEDURE — 99213 OFFICE O/P EST LOW 20 MIN: CPT | Performed by: FAMILY MEDICINE

## 2020-01-17 PROCEDURE — 1036F TOBACCO NON-USER: CPT | Performed by: FAMILY MEDICINE

## 2020-01-17 PROCEDURE — 80053 COMPREHEN METABOLIC PANEL: CPT

## 2020-01-17 PROCEDURE — 90715 TDAP VACCINE 7 YRS/> IM: CPT | Performed by: FAMILY MEDICINE

## 2020-01-17 PROCEDURE — 3008F BODY MASS INDEX DOCD: CPT | Performed by: FAMILY MEDICINE

## 2020-01-17 PROCEDURE — 20550 NJX 1 TENDON SHEATH/LIGAMENT: CPT | Performed by: FAMILY MEDICINE

## 2020-01-17 PROCEDURE — 36415 COLL VENOUS BLD VENIPUNCTURE: CPT

## 2020-01-17 PROCEDURE — 80061 LIPID PANEL: CPT

## 2020-01-17 RX ORDER — TRIAMCINOLONE ACETONIDE 40 MG/ML
20 INJECTION, SUSPENSION INTRA-ARTICULAR; INTRAMUSCULAR
Status: COMPLETED | OUTPATIENT
Start: 2020-01-17 | End: 2020-01-17

## 2020-01-17 RX ORDER — LIDOCAINE HYDROCHLORIDE 10 MG/ML
0.5 INJECTION, SOLUTION INFILTRATION; PERINEURAL
Status: COMPLETED | OUTPATIENT
Start: 2020-01-17 | End: 2020-01-17

## 2020-01-17 RX ADMIN — LIDOCAINE HYDROCHLORIDE 0.5 ML: 10 INJECTION, SOLUTION INFILTRATION; PERINEURAL at 08:38

## 2020-01-17 RX ADMIN — TRIAMCINOLONE ACETONIDE 20 MG: 40 INJECTION, SUSPENSION INTRA-ARTICULAR; INTRAMUSCULAR at 08:38

## 2020-01-17 NOTE — PROGRESS NOTES
Assessment/Plan:  Hand/upper extremity injection  Date/Time: 1/17/2020 8:38 AM  Consent given by: patient  Supporting Documentation  Indications: pain and tendon swelling   Procedure Details  Condition:trigger finger Location: thumb -   Needle size: 22 G  Medications administered: 0 5 mL lidocaine 1 %; 20 mg triamcinolone acetonide 40 mg/mL                Problem List Items Addressed This Visit        Digestive    Fatty liver     monitor LFTs  Work on weight loss  He rarely has any alcohol at this point  Cardiovascular and Mediastinum    Essential hypertension - Primary     Well controlled  Continue current medications  Notify me if he is having any persistent or recurrent lightheadedness as we may need to scale back to just 1 medication  Relevant Orders    Comprehensive metabolic panel    Lipid Panel with Direct LDL reflex    CBC and differential       Musculoskeletal and Integument    Trigger finger of right thumb      I injected his trigger finger today under aseptic conditions  I utilized  20 mg of Kenalog and 1 5 mL of 1% lidocaine  He tolerated the procedure well  Genitourinary    BPH with obstruction/lower urinary tract symptoms       Currently stable  He has seen urology for hematuria  Other    Gout    Relevant Orders    Uric acid    Pure hypercholesterolemia       Continue atorvastatin  Check fasting lipids  Relevant Orders    Comprehensive metabolic panel    Lipid Panel with Direct LDL reflex    ED (erectile dysfunction)       Stable with as needed sildenafil             Other Visit Diagnoses     Overweight with body mass index (BMI) 25 0-29 9        Need for Tdap vaccination        Relevant Orders    TDAP VACCINE GREATER THAN OR EQUAL TO 6YO IM (Completed)    Screening for HIV (human immunodeficiency virus)        Relevant Orders    [facility] HIV 1/2 AG-AB combo    Need for hepatitis C screening test        Relevant Orders    Hepatitis C antibody          BMI Counseling: Body mass index is 29 49 kg/m²  The BMI is above normal  Nutrition recommendations include encouraging healthy choices of fruits and vegetables  Exercise recommendations include moderate physical activity 150 minutes/week  Subjective:      Patient ID: Thaddeus Lomeli is a 62 y o  male  HPI  Patient presents today for follow-up for chronic health issues  He has a history of hypertension   And notes that his home blood pressure readings have been well controlled  He has had no problems with his medication except for a rare occasion of lightheadedness  He denies any chest pain, shortness of breath, palpitations or lightheadedness  He tolerates atorvastatin without significant myalgias  Does have some mild intermittent ED and takes Viagra just on a rare occasion  He is having some issues with triggering of his right thumb  I injected this several years ago with good results  He requests another injection  The following portions of the patient's history were reviewed and updated as appropriate: allergies, current medications, past family history, past medical history, past social history, past surgical history and problem list       Current Outpatient Medications:     atorvastatin (LIPITOR) 20 mg tablet, Take 1 tablet (20 mg total) by mouth daily, Disp: 90 tablet, Rfl: 3    lisinopril-hydrochlorothiazide (PRINZIDE,ZESTORETIC) 10-12 5 MG per tablet, Take 1 tablet by mouth daily, Disp: 90 tablet, Rfl: 3    sildenafil (VIAGRA) 100 mg tablet, Take 1 tablet (100 mg total) by mouth daily as needed for erectile dysfunction (1/2 to 1 full tab daily p r n ), Disp: 6 tablet, Rfl: 3     Review of Systems   Constitutional: Negative for appetite change, chills, fatigue, fever and unexpected weight change  HENT: Negative for trouble swallowing  Eyes: Negative for visual disturbance     Respiratory: Negative for cough, chest tightness, shortness of breath and wheezing  Cardiovascular: Negative for chest pain  Gastrointestinal: Negative for abdominal distention, abdominal pain, blood in stool, constipation and diarrhea  Endocrine: Negative for polyuria  Genitourinary: Negative for difficulty urinating and flank pain  Musculoskeletal: Negative for arthralgias and myalgias  Skin: Negative for rash  Neurological: Negative for dizziness and light-headedness  Hematological: Negative for adenopathy  Does not bruise/bleed easily  Psychiatric/Behavioral: Negative for sleep disturbance  Objective:      /84   Pulse 80   Temp (!) 96 7 °F (35 9 °C)   Resp 18   Ht 5' 10" (1 778 m)   Wt 93 2 kg (205 lb 8 oz)   SpO2 97%   BMI 29 49 kg/m²          Physical Exam   Constitutional: He is oriented to person, place, and time  He appears well-developed and well-nourished  No distress  HENT:   Head: Normocephalic  Eyes: Pupils are equal, round, and reactive to light  Right eye exhibits no discharge  Left eye exhibits no discharge  Neck: No tracheal deviation present  No thyromegaly present  Cardiovascular: Normal rate, regular rhythm and normal heart sounds  No murmur heard  Pulmonary/Chest: Effort normal  No respiratory distress  He has no wheezes  He has no rales  Abdominal: Soft  He exhibits no distension  There is no tenderness  Musculoskeletal: Normal range of motion  He exhibits no edema  Lymphadenopathy:     He has no cervical adenopathy  Neurological: He is alert and oriented to person, place, and time  No cranial nerve deficit  Skin: Skin is warm  He is not diaphoretic  No erythema  Psychiatric: He has a normal mood and affect   Judgment and thought content normal          Elie Austin MD

## 2020-01-17 NOTE — ASSESSMENT & PLAN NOTE
Well controlled  Continue current medications  Notify me if he is having any persistent or recurrent lightheadedness as we may need to scale back to just 1 medication

## 2020-01-17 NOTE — ASSESSMENT & PLAN NOTE
I injected his trigger finger today under aseptic conditions  I utilized  20 mg of Kenalog and 1 5 mL of 1% lidocaine  He tolerated the procedure well

## 2020-04-16 DIAGNOSIS — I10 ESSENTIAL HYPERTENSION: Primary | ICD-10-CM

## 2020-04-16 RX ORDER — HYDROCHLOROTHIAZIDE 25 MG/1
25 TABLET ORAL DAILY
Qty: 90 TABLET | Refills: 3 | Status: SHIPPED | OUTPATIENT
Start: 2020-04-16 | End: 2020-07-31 | Stop reason: ALTCHOICE

## 2020-04-23 DIAGNOSIS — E78.00 PURE HYPERCHOLESTEROLEMIA: ICD-10-CM

## 2020-04-24 RX ORDER — ATORVASTATIN CALCIUM 20 MG/1
TABLET, FILM COATED ORAL
Qty: 30 TABLET | Refills: 11 | Status: SHIPPED | OUTPATIENT
Start: 2020-04-24 | End: 2021-05-03

## 2020-07-31 ENCOUNTER — OFFICE VISIT (OUTPATIENT)
Dept: FAMILY MEDICINE CLINIC | Facility: CLINIC | Age: 55
End: 2020-07-31
Payer: COMMERCIAL

## 2020-07-31 VITALS
WEIGHT: 209.2 LBS | TEMPERATURE: 97.3 F | BODY MASS INDEX: 29.95 KG/M2 | HEART RATE: 86 BPM | DIASTOLIC BLOOD PRESSURE: 88 MMHG | HEIGHT: 70 IN | SYSTOLIC BLOOD PRESSURE: 138 MMHG | RESPIRATION RATE: 18 BRPM | OXYGEN SATURATION: 97 %

## 2020-07-31 DIAGNOSIS — Z00.00 ANNUAL PHYSICAL EXAM: Primary | ICD-10-CM

## 2020-07-31 DIAGNOSIS — K64.9 HEMORRHOIDS, UNSPECIFIED HEMORRHOID TYPE: ICD-10-CM

## 2020-07-31 DIAGNOSIS — M10.00 IDIOPATHIC GOUT, UNSPECIFIED CHRONICITY, UNSPECIFIED SITE: ICD-10-CM

## 2020-07-31 DIAGNOSIS — M65.311 TRIGGER FINGER OF RIGHT THUMB: ICD-10-CM

## 2020-07-31 DIAGNOSIS — N13.8 BPH WITH OBSTRUCTION/LOWER URINARY TRACT SYMPTOMS: ICD-10-CM

## 2020-07-31 DIAGNOSIS — K76.0 FATTY LIVER: ICD-10-CM

## 2020-07-31 DIAGNOSIS — N52.9 ERECTILE DYSFUNCTION, UNSPECIFIED ERECTILE DYSFUNCTION TYPE: ICD-10-CM

## 2020-07-31 DIAGNOSIS — E78.00 PURE HYPERCHOLESTEROLEMIA: ICD-10-CM

## 2020-07-31 DIAGNOSIS — I10 ESSENTIAL HYPERTENSION: ICD-10-CM

## 2020-07-31 DIAGNOSIS — N40.1 BPH WITH OBSTRUCTION/LOWER URINARY TRACT SYMPTOMS: ICD-10-CM

## 2020-07-31 PROCEDURE — 99396 PREV VISIT EST AGE 40-64: CPT | Performed by: FAMILY MEDICINE

## 2020-07-31 PROCEDURE — 3008F BODY MASS INDEX DOCD: CPT | Performed by: FAMILY MEDICINE

## 2020-07-31 PROCEDURE — 3079F DIAST BP 80-89 MM HG: CPT | Performed by: FAMILY MEDICINE

## 2020-07-31 PROCEDURE — 3075F SYST BP GE 130 - 139MM HG: CPT | Performed by: FAMILY MEDICINE

## 2020-07-31 RX ORDER — SILDENAFIL 100 MG/1
100 TABLET, FILM COATED ORAL DAILY PRN
Qty: 6 TABLET | Refills: 3 | Status: SHIPPED | OUTPATIENT
Start: 2020-07-31 | End: 2021-08-16

## 2020-07-31 RX ORDER — VALSARTAN 160 MG/1
160 TABLET ORAL DAILY
Qty: 90 TABLET | Refills: 3 | Status: SHIPPED | OUTPATIENT
Start: 2020-07-31 | End: 2021-08-06

## 2020-07-31 NOTE — ASSESSMENT & PLAN NOTE
Blood pressure is borderline at this point  He sometimes notes a sense of dysphoria which may be coming from his hydrochlorothiazide and we are going to try valsartan 160 mg daily  I would suggest the checks his home blood pressure readings a few times per week especially in this transition  Notify me if he is running consistently above 140/90

## 2020-07-31 NOTE — ASSESSMENT & PLAN NOTE
Refer to Colorectal for some intermittent rectal bleeding from his hemorrhoids  He did have a colonoscopy that was clean besides his hemorrhoids 4 years ago

## 2020-07-31 NOTE — PATIENT INSTRUCTIONS
Wellness Visit for Adults   AMBULATORY CARE:   A wellness visit  is when you see your healthcare provider to get screened for health problems  You can also get advice on how to stay healthy  Write down your questions so you remember to ask them  Ask your healthcare provider how often you should have a wellness visit  What happens at a wellness visit:  Your healthcare provider will ask about your health, and your family history of health problems  This includes high blood pressure, heart disease, and cancer  He or she will ask if you have symptoms that concern you, if you smoke, and about your mood  You may also be asked about your intake of medicines, supplements, food, and alcohol  Any of the following may be done:  · Your weight  will be checked  Your height may also be checked so your body mass index (BMI) can be calculated  Your BMI shows if you are at a healthy weight  · Your blood pressure  and heart rate will be checked  Your temperature may also be checked  · Blood and urine tests  may be done  Blood tests may be done to check your cholesterol levels  Abnormal cholesterol levels increase your risk for heart disease and stroke  You may also need a blood or urine test to check for diabetes if you are at increased risk  Urine tests may be done to look for signs of an infection or kidney disease  · A physical exam  includes checking your heartbeat and lungs with a stethoscope  Your healthcare provider may also check your skin to look for sun damage  · Screening tests  may be recommended  A screening test is done to check for diseases that may not cause symptoms  The screening tests you may need depend on your age, gender, family history, and lifestyle habits  For example, colorectal screening may be recommended if you are 48years old or older  Screening tests you need if you are a woman:   · A Pap smear  is used to screen for cervical cancer   Pap smears are usually done every 3 to 5 years depending on your age  You may need them more often if you have had abnormal Pap smear test results in the past  Ask your healthcare provider how often you should have a Pap smear  · A mammogram  is an x-ray of your breasts to screen for breast cancer  Experts recommend mammograms every 2 years starting at age 48 years  You may need a mammogram at age 52 years or younger if you have an increased risk for breast cancer  Talk to your healthcare provider about when you should start having mammograms and how often you need them  Vaccines you may need:   · Get an influenza vaccine  every year  The influenza vaccine protects you from the flu  Several types of viruses cause the flu  The viruses change over time, so new vaccines are made each year  · Get a tetanus-diphtheria (Td) booster vaccine  every 10 years  This vaccine protects you against tetanus and diphtheria  Tetanus is a severe infection that may cause painful muscle spasms and lockjaw  Diphtheria is a severe bacterial infection that causes a thick covering in the back of your mouth and throat  · Get a human papillomavirus (HPV) vaccine  if you are female and aged 23 to 32 or male 23 to 24 and never received it  This vaccine protects you from HPV infection  HPV is the most common infection spread by sexual contact  HPV may also cause vaginal, penile, and anal cancers  · Get a pneumococcal vaccine  if you are aged 72 years or older  The pneumococcal vaccine is an injection given to protect you from pneumococcal disease  Pneumococcal disease is an infection caused by pneumococcal bacteria  The infection may cause pneumonia, meningitis, or an ear infection  · Get a shingles vaccine  if you are aged 61 or older, even if you have had shingles before  The shingles vaccine is an injection to protect you from the varicella-zoster virus  This is the same virus that causes chickenpox   Shingles is a painful rash that develops in people who had chickenpox or have been exposed to the virus  How to eat healthy:  My Plate is a model for planning healthy meals  It shows the types and amounts of foods that should go on your plate  Fruits and vegetables make up about half of your plate, and grains and protein make up the other half  A serving of dairy is included on the side of your plate  The amount of calories and serving sizes you need depends on your age, gender, weight, and height  Examples of healthy foods are listed below:  · Eat a variety of vegetables  such as dark green, red, and orange vegetables  You can also include canned vegetables low in sodium (salt) and frozen vegetables without added butter or sauces  · Eat a variety of fresh fruits , canned fruit in 100% juice, frozen fruit, and dried fruit  · Include whole grains  At least half of the grains you eat should be whole grains  Examples include whole-wheat bread, wheat pasta, brown rice, and whole-grain cereals such as oatmeal     · Eat a variety of protein foods such as seafood (fish and shellfish), lean meat, and poultry without skin (turkey and chicken)  Examples of lean meats include pork leg, shoulder, or tenderloin, and beef round, sirloin, tenderloin, and extra lean ground beef  Other protein foods include eggs and egg substitutes, beans, peas, soy products, nuts, and seeds  · Choose low-fat dairy products such as skim or 1% milk or low-fat yogurt, cheese, and cottage cheese  · Limit unhealthy fats  such as butter, hard margarine, and shortening  Exercise:  Exercise at least 30 minutes per day on most days of the week  Some examples of exercise include walking, biking, dancing, and swimming  You can also fit in more physical activity by taking the stairs instead of the elevator or parking farther away from stores  Include muscle strengthening activities 2 days each week  Regular exercise provides many health benefits   It helps you manage your weight, and decreases your risk for type 2 diabetes, heart disease, stroke, and high blood pressure  Exercise can also help improve your mood  Ask your healthcare provider about the best exercise plan for you  General health and safety guidelines:   · Do not smoke  Nicotine and other chemicals in cigarettes and cigars can cause lung damage  Ask your healthcare provider for information if you currently smoke and need help to quit  E-cigarettes or smokeless tobacco still contain nicotine  Talk to your healthcare provider before you use these products  · Limit alcohol  A drink of alcohol is 12 ounces of beer, 5 ounces of wine, or 1½ ounces of liquor  · Lose weight, if needed  Being overweight increases your risk of certain health conditions  These include heart disease, high blood pressure, type 2 diabetes, and certain types of cancer  · Protect your skin  Do not sunbathe or use tanning beds  Use sunscreen with a SPF 15 or higher  Apply sunscreen at least 15 minutes before you go outside  Reapply sunscreen every 2 hours  Wear protective clothing, hats, and sunglasses when you are outside  · Drive safely  Always wear your seatbelt  Make sure everyone in your car wears a seatbelt  A seatbelt can save your life if you are in an accident  Do not use your cell phone when you are driving  This could distract you and cause an accident  Pull over if you need to make a call or send a text message  · Practice safe sex  Use latex condoms if are sexually active and have more than one partner  Your healthcare provider may recommend screening tests for sexually transmitted infections (STIs)  · Wear helmets, lifejackets, and protective gear  Always wear a helmet when you ride a bike or motorcycle, go skiing, or play sports that could cause a head injury  Wear protective equipment when you play sports  Wear a lifejacket when you are on a boat or doing water sports    © 2017 [employer] Information is for End User's use only and may not be sold, redistributed or otherwise used for commercial purposes  All illustrations and images included in CareNotes® are the copyrighted property of [employer]  or Allen Rao  The above information is an  only  It is not intended as medical advice for individual conditions or treatments  Talk to your doctor, nurse or pharmacist before following any medical regimen to see if it is safe and effective for you  Weight Management   AMBULATORY CARE:   Why it is important to manage your weight:  Being overweight increases your risk of health conditions such as heart disease, high blood pressure, type 2 diabetes, and certain types of cancer  It can also increase your risk for osteoarthritis, sleep apnea, and other respiratory problems  Aim for a slow, steady weight loss  Even a small amount of weight loss can lower your risk of health problems  How to lose weight safely:  A safe and healthy way to lose weight is to eat fewer calories and get regular exercise  You can lose up about 1 pound a week by decreasing the number of calories you eat by 500 calories each day  You can decrease calories by eating smaller portion sizes or by cutting out high-calorie foods  Read labels to find out how many calories are in the foods you eat  You can also burn calories with exercise such as walking, swimming, or biking  You will be more likely to keep weight off if you make these changes part of your lifestyle  Healthy meal plan for weight management:  A healthy meal plan includes a variety of foods, contains fewer calories, and helps you stay healthy  A healthy meal plan includes the following:  · Eat whole-grain foods more often  A healthy meal plan should contain fiber  Fiber is the part of grains, fruits, and vegetables that is not broken down by your body  Whole-grain foods are healthy and provide extra fiber in your diet   Some examples of whole-grain foods are whole-wheat breads and pastas, oatmeal, brown rice, and bulgur  · Eat a variety of vegetables every day  Include dark, leafy greens such as spinach, kale, kely greens, and mustard greens  Eat yellow and orange vegetables such as carrots, sweet potatoes, and winter squash  · Eat a variety of fruits every day  Choose fresh or canned fruit (canned in its own juice or light syrup) instead of juice  Fruit juice has very little or no fiber  · Eat low-fat dairy foods  Drink fat-free (skim) milk or 1% milk  Eat fat-free yogurt and low-fat cottage cheese  Try low-fat cheeses such as mozzarella and other reduced-fat cheeses  · Choose meat and other protein foods that are low in fat  Choose beans or other legumes such as split peas or lentils  Choose fish, skinless poultry (chicken or turkey), or lean cuts of red meat (beef or pork)  Before you cook meat or poultry, cut off any visible fat  · Use less fat and oil  Try baking foods instead of frying them  Add less fat, such as margarine, sour cream, regular salad dressing and mayonnaise to foods  Eat fewer high-fat foods  Some examples of high-fat foods include french fries, doughnuts, ice cream, and cakes  · Eat fewer sweets  Limit foods and drinks that are high in sugar  This includes candy, cookies, regular soda, and sweetened drinks  Ways to decrease calories:   · Eat smaller portions  ¨ Use a small plate with smaller servings  ¨ Do not eat second helpings  ¨ When you eat at a restaurant, ask for a box and place half of your meal in the box before you eat  ¨ Share an entrée with someone else  · Replace high-calorie snacks with healthy, low-calorie snacks  ¨ Choose fresh fruit, vegetables, fat-free rice cakes, or air-popped popcorn instead of potato chips, nuts, or chocolate  ¨ Choose water or calorie-free drinks instead of soda or sweetened drinks  · Eat regular meals  Skipping meals can lead to overeating later in the day   Eat a healthy snack in place of a meal if you do not have time to eat a regular meal      · Do not shop for groceries when you are hungry  You may be more likely to make unhealthy food choices  Take a grocery list of healthy foods and shop after you have eaten  Exercise:  Exercise at least 30 minutes per day on most days of the week  Some examples of exercise include walking, biking, dancing, and swimming  You can also fit in more physical activity by taking the stairs instead of the elevator or parking farther away from stores  Ask your healthcare provider about the best exercise plan for you  Other things to consider as you try to lose weight:   · Be aware of situations that may give you the urge to overeat, such as eating while watching television  Find ways to avoid these situations  For example, read a book, go for a walk, or do crafts  · Meet with a weight loss support group or friends who are also trying to lose weight  This may help you stay motivated to continue working on your weight loss goals  © 2017 [facility] Information is for End User's use only and may not be sold, redistributed or otherwise used for commercial purposes  All illustrations and images included in CareNotes® are the copyrighted property of [employer]  or Allen Rao  The above information is an  only  It is not intended as medical advice for individual conditions or treatments  Talk to your doctor, nurse or pharmacist before following any medical regimen to see if it is safe and effective for you  Cholesterol and Your Health   AMBULATORY CARE:   Cholesterol  is a waxy, fat-like substance  Cholesterol is made by your body, but also comes from certain foods you eat  Your body uses cholesterol to make hormones and new cells  Your body also uses cholesterol to protect nerves  Cholesterol comes from foods such as meat and dairy products   Your total cholesterol level is made up by LDL cholesterol, HDL cholesterol, and triglycerides:  · LDL cholesterol  is called bad cholesterol  because it forms plaque in your arteries  As plaque builds up, your arteries become narrow, and less blood flows through  When plaque decreases blood flow to your heart, you may have chest pain  If plaque completely blocks an artery that bring blood to your heart, you may have a heart attack  Plaque can break off and form blood clots  Blood clots may block arteries in your brain and cause a stroke  · HDL cholesterol  is called good cholesterol  because it helps remove LDL cholesterol from your arteries  It does this by attaching to LDL cholesterol and carrying it to your liver  Your liver breaks down LDL cholesterol so your body can get rid of it  High levels of HDL cholesterol can help prevent a heart attack and stroke  Low levels of HDL cholesterol can increase your risk for heart disease, heart attack, and stroke  · Triglycerides  are a type of fat that store energy from foods you eat  High levels of triglycerides also cause plaque buildup  This can increase your risk for a heart attack or stroke  If your triglyceride level is high, your LDL cholesterol level may also be high  How food affects your cholesterol levels:   · Unhealthy fats  increase LDL cholesterol and triglyceride levels in your blood  They are found in foods high in cholesterol, saturated fat, and trans fat:     ¨ Cholesterol  is found in eggs, dairy, and meat  ¨ Saturated fat  is found in butter, cheese, ice cream, whole milk, and coconut oil  Saturated fat is also found in meat, such as sausage, hot dogs, and bologna  ¨ Trans fat  is found in liquid oils and is used in fried and baked foods  Foods that contain trans fats include chips, crackers, muffins, sweet rolls, microwave popcorn, and cookies  · Healthy fats,  also called unsaturated fats, help lower LDL cholesterol and triglyceride levels   Healthy fats include the following: ¨ Monounsaturated fats  are found in foods such as olive oil, canola oil, avocado, nuts, and olives  ¨ Polyunsaturated fats,  such as omega 3 fats, are found in fish, such as salmon, trout, and tuna  They can also be found in plant foods such as flaxseed, walnuts, and soybeans  Other things that affect your cholesterol levels:   · Smoking cigarettes    · Being overweight or obese     · Drinking large amounts of alcohol    · Not enough exercise or no exercise    · Certain genes passed from your parents to you  What you need to know about having your cholesterol levels checked: Adults 21to 39years of age should have their cholesterol levels checked every 4 to 6 years  Adults 45 years and older should have their cholesterol checked every 1 to 2 years  You may need your cholesterol checked more often, or at a younger age, if you have risk factors for heart disease  You may also need to have your cholesterol checked more often if you have other health conditions, such as diabetes  Blood tests are used to check cholesterol levels  Blood tests measure your levels of triglycerides, LDL cholesterol, and HDL cholesterol  Cholesterol level goals: Your cholesterol level goal may depend on your risk for heart disease  It may also depend on your age and other health conditions  Ask your healthcare provider if the following goals are right for you:  · Your total cholesterol level  should be less than 200 mg/dL  This number may also depend on your HDL and LDL cholesterol goals  · Your LDL cholesterol level  should be less than 130 mg/dL  · Your HDL cholesterol level  should be 60 mg/dL or higher  · Your triglyceride level  should be less than 150 mg/dL  Treatment for high cholesterol:  Treatment for high cholesterol will also decrease your risk of heart disease, heart attack, and stroke   Treatment may include any of the following:  · Medicines  may be given to lower your LDL cholesterol, triglyceride levels, or total cholesterol level  You may need medicines to lower your cholesterol if any of the following is true:     ¨ You have a history of stroke, TIA, unstable angina, or a heart attack    ¨ Your LDL cholesterol level is 190 mg/dL or higher    ¨ You are age 36to 76years of age, have diabetes, and your LDL cholesterol is 70 mg/dL or higher    ¨ You are age 36to 76years of age, have risk factors for heart disease, and your LDL cholesterol is 70 mg/dL or higher    · Lifestyle changes  include changes to your diet, exercise, weight loss, and quitting smoking  It also includes decreasing the amount of alcohol you drink  · Supplements  include fish oil, red yeast rice, and garlic  Fish oil may help lower your triglyceride and LDL cholesterol levels  It may also increase your HDL cholesterol level  Red yeast rice may help decrease your total cholesterol level and LDL cholesterol level  Garlic may help lower your total cholesterol level  Do not take these supplements without talking to your healthcare provider  Nutrition to help lower your cholesterol levels:  A registered dietitian can help you create a healthy eating plan  Read food labels and choose foods low in saturated fat, trans fats, and cholesterol  · Decrease the total amount of fat you eat  Choose lean meats, fat-free or 1% fat milk, and low-fat dairy products, such as yogurt and cheese  Try to limit or avoid red meats  Limit or do not eat fried foods or baked goods such as cookies  · Replace unhealthy fats with healthy fats  Cook foods in olive oil or canola oil  Choose soft margarines that are low in saturated fat and trans fat  Seeds, nuts, and avocados are other examples of healthy fats  · Eat foods with omega-3 fats  Examples include salmon, tuna, mackerel, walnuts, and flaxseed  Eat fish 2 times per week   Children and pregnant women should not eat fish that have high levels of mercury, such as shark, swordfish, and kenia mackerel  · Increase the amount of plant-based foods you eat  Plant-based foods are low in cholesterol and fat  Eating more of these foods may help lower your cholesterol and help you lose weight  Examples of plant-based foods includes fruits, vegetables, legumes, and whole grains  Replace milk that contains dairy with almond, soy, or coconut milk  Eat beans and foods with soy for protein instead of meat  Ask your healthcare provider or dietitian for more information on plant-based foods  · Increase the amount of fiber you eat  High-fiber foods can help lower your LDL cholesterol  You should eat between 20 and 30 grams of fiber each day  Eat at least 5 servings of fruits and vegetables each day  Other examples of high-fiber foods include whole-grain or whole-wheat breads, pastas, or cereals, and brown rice  Eat 3 ounces of whole-grain foods each day  Increase fiber slowly  You may have abdominal discomfort, bloating, and gas if you add fiber to your diet too quickly  Lifestyle changes you can make to help lower your cholesterol levels:   · Maintain a healthy weight  Ask your healthcare provider how much you should weigh  Ask him or her to help you create a weight loss plan if you are overweight  Weight loss can decrease your total cholesterol and triglyceride levels  · Exercise regularly  Exercise can help lower your total cholesterol level and maintain a healthy weight  Exercise can also help increase your HDL cholesterol level  Work with your healthcare provider to create an exercise program that is right for you  Get at least 30 minutes of moderate exercise most days of the week  Examples of exercise include brisk walking, swimming, or biking  · Do not smoke  Nicotine and other chemicals in cigarettes and cigars can damage your lungs, heart, and blood vessels  They can also raise your triglyceride levels   Ask your healthcare provider for information if you currently smoke and need help to quit  E-cigarettes or smokeless tobacco still contain nicotine  Talk to your healthcare provider before you use these products  · Limit or do not drink alcohol  Alcohol can increase your triglyceride levels  Ask your healthcare provider if it is safe for you to drink alcohol  Also ask how much is safe for you to drink each day  © 2017 [employer] Information is for End User's use only and may not be sold, redistributed or otherwise used for commercial purposes  All illustrations and images included in CareNotes® are the copyrighted property of [employer]  or Allen Rao  The above information is an  only  It is not intended as medical advice for individual conditions or treatments  Talk to your doctor, nurse or pharmacist before following any medical regimen to see if it is safe and effective for you

## 2020-07-31 NOTE — PROGRESS NOTES
ADULT ANNUAL [facility]    NAME: Karlos Lim  AGE: 62 y o  SEX: male  : 1965     DATE: 2020     Assessment and Plan:     Problem List Items Addressed This Visit        Digestive    Fatty liver    Relevant Orders    Comprehensive metabolic panel    Hemorrhoids     Refer to Colorectal for some intermittent rectal bleeding from his hemorrhoids  He did have a colonoscopy that was clean besides his hemorrhoids 4 years ago  Relevant Orders    Ambulatory referral to Colorectal Surgery       Cardiovascular and Mediastinum    Essential hypertension     Blood pressure is borderline at this point  He sometimes notes a sense of dysphoria which may be coming from his hydrochlorothiazide and we are going to try valsartan 160 mg daily  I would suggest the checks his home blood pressure readings a few times per week especially in this transition  Notify me if he is running consistently above 140/90  Relevant Medications    valsartan (DIOVAN) 160 mg tablet    Other Relevant Orders    Comprehensive metabolic panel    CBC and Platelet       Musculoskeletal and Integument    Trigger finger of right thumb    Relevant Orders    Ambulatory referral to Hand Surgery       Genitourinary    BPH with obstruction/lower urinary tract symptoms    Relevant Medications    sildenafil (VIAGRA) 100 mg tablet       Other    Gout    Relevant Orders    Uric acid    Pure hypercholesterolemia    Relevant Orders    Lipid panel    ED (erectile dysfunction)    Relevant Medications    sildenafil (VIAGRA) 100 mg tablet      Other Visit Diagnoses     Annual physical exam    -  Primary          Immunizations and preventive care screenings were discussed with patient today  Appropriate education was printed on patient's after visit summary      Counseling:  Dental Health: discussed importance of regular tooth brushing, flossing, and dental visits  · Exercise: the importance of regular exercise/physical activity was discussed  Recommend exercise 3-5 times per week for at least 30 minutes  BMI Counseling: Body mass index is 30 02 kg/m²  The BMI is above normal  Nutrition recommendations include encouraging healthy choices of fruits and vegetables  Exercise recommendations include moderate physical activity 150 minutes/week  Return in about 6 months (around 1/31/2021)  Chief Complaint:     Chief Complaint   Patient presents with    Annual Exam      History of Present Illness:     Adult Annual Physical   Patient here for a comprehensive physical exam  The patient reports he feels well overall     He has history of hypertension  He sometimes feels that hydrochlorothiazide may contribute to some dysphoria  He notes he stopped it for a few days and he actually felt his mood improved  He denies any problems chest pain, shortness of breath, palpitations or lightheadedness  He has history of hyperlipidemia tolerate statin therapy without myalgias  He has occasional ED  Valgus help when needed  He does have some persistent issues from right trigger thumb which I previously injected  He has some chronic bilateral shoulder pain which remains stable at this time  He has remote history of gout and has had no recent issues  Diet and Physical Activity  · Diet/Nutrition: well balanced diet  · Exercise: moderate cardiovascular exercise  Depression Screening  PHQ-9 Depression Screening    PHQ-9:    Frequency of the following problems over the past two weeks:       Little interest or pleasure in doing things:  0 - not at all  Feeling down, depressed, or hopeless:  0 - not at all  PHQ-2 Score:  0       General Health  · Sleep: sleeps well  · Hearing: normal - bilateral   · Vision: goes for regular eye exams  · Dental: regular dental visits   Health  · Symptoms include: nocturia (rare) and some mild ED on occasion  Review of Systems:     Review of Systems   Constitutional: Negative for appetite change, chills, fatigue, fever and unexpected weight change  HENT: Negative for congestion and trouble swallowing  Eyes: Negative for visual disturbance  Respiratory: Negative for cough, chest tightness, shortness of breath and wheezing  Cardiovascular: Negative for chest pain, palpitations and leg swelling  Gastrointestinal: Negative for abdominal distention, abdominal pain, blood in stool, constipation and diarrhea  Endocrine: Negative for polyuria  Genitourinary: Negative for difficulty urinating and flank pain  Musculoskeletal: Negative for arthralgias and myalgias  Skin: Negative for rash  Neurological: Negative for dizziness and light-headedness  Hematological: Negative for adenopathy  Does not bruise/bleed easily  Psychiatric/Behavioral: Negative for sleep disturbance  Past Medical History:     Past Medical History:   Diagnosis Date    Allergic reaction     Resolved 3/6/2017     Erectile dysfunction     Gross hematuria     Hypercholesteremia     Hypertension       Past Surgical History:     Past Surgical History:   Procedure Laterality Date    APPENDECTOMY  2011    MI COLONOSCOPY FLX DX W/COLLJ [facility] WHEN PFRMD N/A 9/20/2016    Procedure: COLONOSCOPY;  Surgeon: Lexy Cam MD;  Location: [facility];   Service: Gastroenterology      Family History:     Family History   Problem Relation Age of Onset    Hyperlipidemia Mother       Social History:        Social History     Socioeconomic History    Marital status:      Spouse name: None    Number of children: None    Years of education: None    Highest education level: None   Occupational History    Occupation: Sales   Social Needs    Financial resource strain: None    Food insecurity:     Worry: None     Inability: None    Transportation needs:     Medical: None     Non-medical: None   Tobacco Use    Smoking status: Never Smoker    Smokeless tobacco: Never Used   Substance and Sexual Activity    Alcohol use: Yes     Frequency: Monthly or less    Drug use: No    Sexual activity: None   Lifestyle    Physical activity:     Days per week: None     Minutes per session: None    Stress: None   Relationships    Social connections:     Talks on phone: None     Gets together: None     Attends [employer] service: None     Active member of club or organization: None     Attends meetings of clubs or organizations: None     Relationship status: None    Intimate partner violence:     Fear of current or ex partner: None     Emotionally abused: None     Physically abused: None     Forced sexual activity: None   Other Topics Concern    None   Social History Narrative    None      Current Medications:     Current Outpatient Medications   Medication Sig Dispense Refill    atorvastatin (LIPITOR) 20 mg tablet TAKE 1 TABLET BY MOUTH EVERY DAY 30 tablet 11    sildenafil (VIAGRA) 100 mg tablet Take 1 tablet (100 mg total) by mouth daily as needed for erectile dysfunction (1/2 to 1 full tab daily p r n ) 6 tablet 3    valsartan (DIOVAN) 160 mg tablet Take 1 tablet (160 mg total) by mouth daily 90 tablet 3     No current facility-administered medications for this visit  Allergies:     No Known Allergies   Physical Exam:     /88   Pulse 86   Temp (!) 97 3 °F (36 3 °C)   Resp 18   Ht 5' 10" (1 778 m)   Wt 94 9 kg (209 lb 3 2 oz)   SpO2 97%   BMI 30 02 kg/m²     Physical Exam   Constitutional: He is oriented to person, place, and time  He appears well-developed and well-nourished  HENT:   Head: Normocephalic  Right Ear: External ear normal    Left Ear: External ear normal    Eyes: Pupils are equal, round, and reactive to light  Conjunctivae and EOM are normal    Neck: Normal range of motion  No thyromegaly present  Cardiovascular: Regular rhythm and normal heart sounds  No murmur heard    Pulmonary/Chest: No respiratory distress  He has no wheezes  He has no rales  Abdominal: He exhibits no distension and no mass  There is no tenderness  There is no rebound and no guarding  Musculoskeletal: He exhibits no edema  Neurological: He is alert and oriented to person, place, and time  Skin: No rash noted  No erythema  Vitals reviewed        MD [facility] Kimbrough [id number]

## 2020-08-04 ENCOUNTER — PATIENT MESSAGE (OUTPATIENT)
Dept: FAMILY MEDICINE CLINIC | Facility: CLINIC | Age: 55
End: 2020-08-04

## 2020-08-05 NOTE — TELEPHONE ENCOUNTER
From: Chet Garrison  To: Erin Teague MD  Sent: 8/4/2020 10:29 AM EDT  Subject: Referral Request    where do I view my referral contacts?

## 2020-09-11 PROBLEM — K62.5 RECTAL BLEEDING: Status: ACTIVE | Noted: 2020-09-11

## 2020-11-20 ENCOUNTER — TELEMEDICINE (OUTPATIENT)
Dept: FAMILY MEDICINE CLINIC | Facility: CLINIC | Age: 55
End: 2020-11-20
Payer: COMMERCIAL

## 2020-11-20 DIAGNOSIS — B34.9 VIRAL INFECTION, UNSPECIFIED: Primary | ICD-10-CM

## 2020-11-20 PROCEDURE — 99213 OFFICE O/P EST LOW 20 MIN: CPT | Performed by: FAMILY MEDICINE

## 2020-11-20 PROCEDURE — 1036F TOBACCO NON-USER: CPT | Performed by: FAMILY MEDICINE

## 2021-01-10 ENCOUNTER — IMMUNIZATIONS (OUTPATIENT)
Dept: FAMILY MEDICINE CLINIC | Facility: HOSPITAL | Age: 56
End: 2021-01-10

## 2021-01-10 DIAGNOSIS — Z23 ENCOUNTER FOR IMMUNIZATION: ICD-10-CM

## 2021-01-10 PROCEDURE — 91301 SARS-COV-2 / COVID-19 MRNA VACCINE (MODERNA) 100 MCG: CPT

## 2021-01-10 PROCEDURE — 0011A SARS-COV-2 / COVID-19 MRNA VACCINE (MODERNA) 100 MCG: CPT

## 2021-02-07 ENCOUNTER — IMMUNIZATIONS (OUTPATIENT)
Dept: FAMILY MEDICINE CLINIC | Facility: HOSPITAL | Age: 56
End: 2021-02-07

## 2021-02-07 DIAGNOSIS — Z23 ENCOUNTER FOR IMMUNIZATION: Primary | ICD-10-CM

## 2021-02-07 PROCEDURE — 0012A SARS-COV-2 / COVID-19 MRNA VACCINE (MODERNA) 100 MCG: CPT

## 2021-02-07 PROCEDURE — 91301 SARS-COV-2 / COVID-19 MRNA VACCINE (MODERNA) 100 MCG: CPT

## 2021-05-03 DIAGNOSIS — E78.00 PURE HYPERCHOLESTEROLEMIA: ICD-10-CM

## 2021-05-03 RX ORDER — ATORVASTATIN CALCIUM 20 MG/1
TABLET, FILM COATED ORAL
Qty: 30 TABLET | Refills: 11 | Status: SHIPPED | OUTPATIENT
Start: 2021-05-03 | End: 2022-03-31 | Stop reason: SDUPTHER

## 2021-08-06 DIAGNOSIS — I10 ESSENTIAL HYPERTENSION: ICD-10-CM

## 2021-08-06 RX ORDER — VALSARTAN 160 MG/1
TABLET ORAL
Qty: 30 TABLET | Refills: 11 | Status: SHIPPED | OUTPATIENT
Start: 2021-08-06 | End: 2022-03-31 | Stop reason: SDUPTHER

## 2021-08-06 NOTE — TELEPHONE ENCOUNTER
The patient was called to schedule a follow up appointment for medication refills  The patient is currently out of town and will call the office back next week to schedule an appointment

## 2021-08-16 DIAGNOSIS — N52.9 ERECTILE DYSFUNCTION, UNSPECIFIED ERECTILE DYSFUNCTION TYPE: ICD-10-CM

## 2021-08-16 RX ORDER — SILDENAFIL 100 MG/1
TABLET, FILM COATED ORAL
Qty: 6 TABLET | Refills: 3 | Status: SHIPPED | OUTPATIENT
Start: 2021-08-16 | End: 2022-03-31 | Stop reason: SDUPTHER

## 2022-01-25 ENCOUNTER — VBI (OUTPATIENT)
Dept: ADMINISTRATIVE | Facility: OTHER | Age: 57
End: 2022-01-25

## 2022-03-31 ENCOUNTER — OFFICE VISIT (OUTPATIENT)
Dept: FAMILY MEDICINE CLINIC | Facility: CLINIC | Age: 57
End: 2022-03-31
Payer: COMMERCIAL

## 2022-03-31 VITALS
HEIGHT: 70 IN | HEART RATE: 88 BPM | RESPIRATION RATE: 18 BRPM | SYSTOLIC BLOOD PRESSURE: 160 MMHG | DIASTOLIC BLOOD PRESSURE: 90 MMHG | OXYGEN SATURATION: 98 % | WEIGHT: 209 LBS | TEMPERATURE: 97.9 F | BODY MASS INDEX: 29.92 KG/M2

## 2022-03-31 DIAGNOSIS — N52.9 ERECTILE DYSFUNCTION, UNSPECIFIED ERECTILE DYSFUNCTION TYPE: ICD-10-CM

## 2022-03-31 DIAGNOSIS — Z87.39 HISTORY OF GOUT: ICD-10-CM

## 2022-03-31 DIAGNOSIS — E78.00 PURE HYPERCHOLESTEROLEMIA: ICD-10-CM

## 2022-03-31 DIAGNOSIS — Z12.5 SCREENING FOR PROSTATE CANCER: ICD-10-CM

## 2022-03-31 DIAGNOSIS — M10.00 IDIOPATHIC GOUT, UNSPECIFIED CHRONICITY, UNSPECIFIED SITE: ICD-10-CM

## 2022-03-31 DIAGNOSIS — I10 ESSENTIAL HYPERTENSION: Primary | ICD-10-CM

## 2022-03-31 PROCEDURE — 99214 OFFICE O/P EST MOD 30 MIN: CPT | Performed by: FAMILY MEDICINE

## 2022-03-31 RX ORDER — ATORVASTATIN CALCIUM 20 MG/1
20 TABLET, FILM COATED ORAL DAILY
Qty: 90 TABLET | Refills: 3 | Status: SHIPPED | OUTPATIENT
Start: 2022-03-31

## 2022-03-31 RX ORDER — VALSARTAN 160 MG/1
160 TABLET ORAL DAILY
Qty: 90 TABLET | Refills: 3 | Status: SHIPPED | OUTPATIENT
Start: 2022-03-31

## 2022-03-31 RX ORDER — SILDENAFIL 100 MG/1
TABLET, FILM COATED ORAL
Qty: 10 TABLET | Refills: 3 | Status: SHIPPED | OUTPATIENT
Start: 2022-03-31

## 2022-03-31 NOTE — ASSESSMENT & PLAN NOTE
Will renew the valsartan 160 mg daily  Urged him not to run out of blood pressure meds in the future

## 2022-03-31 NOTE — PROGRESS NOTES
Assessment/Plan:    Essential hypertension  Will renew the valsartan 160 mg daily  Urged him not to run out of blood pressure meds in the future  Gout  No recent gouty attacks  Urged increased water intake  Will check uric acid level       Diagnoses and all orders for this visit:    Screening for prostate cancer  -     PSA, Total Screen; Future    Pure hypercholesterolemia  -     atorvastatin (LIPITOR) 20 mg tablet; Take 1 tablet (20 mg total) by mouth daily  -     Lipid panel; Future  -     Comprehensive metabolic panel; Future    Erectile dysfunction, unspecified erectile dysfunction type  -     sildenafil (VIAGRA) 100 mg tablet; Take 1/2 or 1 tab po prn    Essential hypertension  -     valsartan (DIOVAN) 160 mg tablet; Take 1 tablet (160 mg total) by mouth daily    History of gout  -     Uric acid; Future    Idiopathic gout, unspecified chronicity, unspecified site          Subjective:      Patient ID: Roge Richardson is a 60 y o  male  He is here for renewal of blood pressure medications  Unfortunately he ran out about 3 weeks ago and felt fine at 1st but then started getting a little headachy and realized his blood pressure is rising  He has history of gout, no recent attacks  He also ran out of atorvastatin and sildenafil  No office visit for nearly 2 years      The following portions of the patient's history were reviewed and updated as appropriate: allergies, current medications, past family history, past medical history, past social history, past surgical history and problem list     Review of Systems   Constitutional: Negative for activity change, chills and fever  Respiratory: Negative for chest tightness  Cardiovascular: Negative for chest pain  Neurological: Negative for dizziness and headaches  Psychiatric/Behavioral: Negative for agitation and dysphoric mood  The patient is not nervous/anxious            Objective:      /90 (BP Location: Left arm, Patient Position: Sitting, Cuff Size: Large)   Pulse 88   Temp 97 9 °F (36 6 °C) (Tympanic)   Resp 18   Ht 5' 10" (1 778 m)   Wt 94 8 kg (209 lb)   SpO2 98%   BMI 29 99 kg/m²          Physical Exam  Vitals and nursing note reviewed  Constitutional:       Appearance: Normal appearance  HENT:      Head: Normocephalic and atraumatic  Mouth/Throat:      Mouth: Mucous membranes are moist    Cardiovascular:      Rate and Rhythm: Normal rate and regular rhythm  Pulses: Normal pulses  Heart sounds: Normal heart sounds  Pulmonary:      Effort: Pulmonary effort is normal       Breath sounds: Normal breath sounds  Musculoskeletal:      Right lower leg: No edema  Left lower leg: No edema  Skin:     General: Skin is warm and dry  Neurological:      Mental Status: He is alert     Psychiatric:         Mood and Affect: Mood normal          Behavior: Behavior normal

## 2022-04-04 ENCOUNTER — OFFICE VISIT (OUTPATIENT)
Dept: FAMILY MEDICINE CLINIC | Facility: CLINIC | Age: 57
End: 2022-04-04
Payer: COMMERCIAL

## 2022-04-04 VITALS
HEIGHT: 70 IN | HEART RATE: 70 BPM | WEIGHT: 212.4 LBS | BODY MASS INDEX: 30.41 KG/M2 | RESPIRATION RATE: 12 BRPM | OXYGEN SATURATION: 98 %

## 2022-04-04 DIAGNOSIS — E66.9 OBESITY (BMI 30-39.9): ICD-10-CM

## 2022-04-04 DIAGNOSIS — Z13.1 SCREENING FOR DIABETES MELLITUS: ICD-10-CM

## 2022-04-04 DIAGNOSIS — Z13.89 SCREENING FOR BLOOD OR PROTEIN IN URINE: ICD-10-CM

## 2022-04-04 DIAGNOSIS — Z13.29 SCREENING FOR HYPOTHYROIDISM: ICD-10-CM

## 2022-04-04 DIAGNOSIS — Z00.00 ANNUAL PHYSICAL EXAM: Primary | ICD-10-CM

## 2022-04-04 PROCEDURE — 3008F BODY MASS INDEX DOCD: CPT | Performed by: INTERNAL MEDICINE

## 2022-04-04 PROCEDURE — 1036F TOBACCO NON-USER: CPT | Performed by: INTERNAL MEDICINE

## 2022-04-04 PROCEDURE — 99396 PREV VISIT EST AGE 40-64: CPT | Performed by: INTERNAL MEDICINE

## 2022-04-04 PROCEDURE — 3725F SCREEN DEPRESSION PERFORMED: CPT | Performed by: INTERNAL MEDICINE

## 2022-04-05 NOTE — PROGRESS NOTES
Assessment/Plan:    No problem-specific Assessment & Plan notes found for this encounter  Diagnoses and all orders for this visit:    Annual physical exam    Screening for diabetes mellitus  -     HEMOGLOBIN A1C W/ EAG ESTIMATION; Future    Screening for hypothyroidism  -     TSH, 3rd generation with Free T4 reflex; Future    Obesity (BMI 30-39 9)  -     Vitamin D 25 hydroxy; Future    Screening for blood or protein in urine  -     UA (URINE) with reflex to Scope          Subjective:      Patient ID: Emily Haley is a 60 y o  male  Patient came for annual checkup  No active complaints  He is up-to-date on his tetanus shot  He will consider Shingrix  Up-to-date on colonoscopy  PSA was ordered  The following portions of the patient's history were reviewed and updated as appropriate: allergies, current medications, past family history, past medical history, past social history, past surgical history, and problem list     Review of Systems   Constitutional: Negative for chills, fatigue and fever  Respiratory: Negative for cough, chest tightness and shortness of breath  Cardiovascular: Negative for chest pain, palpitations and leg swelling  Gastrointestinal: Negative for abdominal distention, abdominal pain, blood in stool, constipation, diarrhea and nausea  Genitourinary: Negative for difficulty urinating and dysuria  Musculoskeletal: Negative for arthralgias, back pain, gait problem, joint swelling, myalgias and neck pain  Skin: Negative for rash  Neurological: Negative for dizziness, weakness, numbness and headaches  Psychiatric/Behavioral: Negative for agitation           Objective:      Pulse 70   Resp 12   Ht 5' 10" (1 778 m)   Wt 96 3 kg (212 lb 6 4 oz)   SpO2 98%   BMI 30 48 kg/m²     No Known Allergies       Current Outpatient Medications:     atorvastatin (LIPITOR) 20 mg tablet, Take 1 tablet (20 mg total) by mouth daily, Disp: 90 tablet, Rfl: 3    sildenafil (VIAGRA) 100 mg tablet, Take 1/2 or 1 tab po prn, Disp: 10 tablet, Rfl: 3    valsartan (DIOVAN) 160 mg tablet, Take 1 tablet (160 mg total) by mouth daily, Disp: 90 tablet, Rfl: 3     There are no Patient Instructions on file for this visit  Physical Exam  Constitutional:       Appearance: He is not ill-appearing  HENT:      Head: Normocephalic and atraumatic  Nose: No congestion or rhinorrhea  Eyes:      Pupils: Pupils are equal, round, and reactive to light  Cardiovascular:      Rate and Rhythm: Normal rate and regular rhythm  Pulses: Normal pulses  Heart sounds: Normal heart sounds  No murmur heard  No friction rub  No gallop  Pulmonary:      Effort: Pulmonary effort is normal  No respiratory distress  Breath sounds: Normal breath sounds  No wheezing or rales  Chest:      Chest wall: No tenderness  Abdominal:      General: Bowel sounds are normal  There is no distension  Palpations: Abdomen is soft  There is no mass  Tenderness: There is no abdominal tenderness  There is no rebound  Hernia: No hernia is present  Musculoskeletal:         General: No swelling, tenderness or deformity  Cervical back: No rigidity  No muscular tenderness  Skin:     Coloration: Skin is not pale  Findings: No erythema, lesion or rash  Neurological:      Mental Status: He is alert and oriented to person, place, and time  Sensory: No sensory deficit  Motor: No weakness     Psychiatric:         Mood and Affect: Mood normal          Behavior: Behavior normal

## 2022-12-16 DIAGNOSIS — I10 ESSENTIAL HYPERTENSION: Primary | ICD-10-CM

## 2022-12-16 RX ORDER — HYDROCHLOROTHIAZIDE 25 MG/1
25 TABLET ORAL DAILY
Qty: 90 TABLET | Refills: 3 | Status: SHIPPED | OUTPATIENT
Start: 2022-12-16

## 2022-12-16 NOTE — PROGRESS NOTES
I called the patient regarding his elevated blood pressure at orthopedics  He notes he had not been taking his blood pressure medication reliably  He feels that he gets anxious on occasion from losartan  He should certainly follow-up for the anxiety as well as his blood pressure within 1 month  He also asked that I switch his medication so I placed him back on hydrochlorothiazide as he felt this worked well in the past   I did remind him that he has gout and this may increase his uric acid but we decided to give this a try at this time  We will schedule him for follow-up in 1 month  He will have his blood work done prior to the visit

## 2022-12-19 ENCOUNTER — TELEPHONE (OUTPATIENT)
Dept: FAMILY MEDICINE CLINIC | Facility: CLINIC | Age: 57
End: 2022-12-19

## 2022-12-19 NOTE — TELEPHONE ENCOUNTER
----- Message from Tnaya Fung MD sent at 12/16/2022  3:59 PM EST -----  Please schedule for a one month follow up  Can be with Priscila

## 2023-01-10 DIAGNOSIS — N52.9 ERECTILE DYSFUNCTION, UNSPECIFIED ERECTILE DYSFUNCTION TYPE: ICD-10-CM

## 2023-01-10 RX ORDER — SILDENAFIL 100 MG/1
TABLET, FILM COATED ORAL
Qty: 10 TABLET | Refills: 3 | Status: SHIPPED | OUTPATIENT
Start: 2023-01-10

## 2023-04-18 DIAGNOSIS — E78.00 PURE HYPERCHOLESTEROLEMIA: ICD-10-CM

## 2023-04-18 RX ORDER — ATORVASTATIN CALCIUM 20 MG/1
20 TABLET, FILM COATED ORAL DAILY
Qty: 90 TABLET | Refills: 0 | Status: SHIPPED | OUTPATIENT
Start: 2023-04-18

## 2023-05-08 ENCOUNTER — OFFICE VISIT (OUTPATIENT)
Dept: FAMILY MEDICINE CLINIC | Facility: CLINIC | Age: 58
End: 2023-05-08

## 2023-05-08 ENCOUNTER — APPOINTMENT (OUTPATIENT)
Dept: LAB | Facility: MEDICAL CENTER | Age: 58
End: 2023-05-08

## 2023-05-08 VITALS
BODY MASS INDEX: 29.58 KG/M2 | WEIGHT: 206.6 LBS | HEIGHT: 70 IN | DIASTOLIC BLOOD PRESSURE: 98 MMHG | HEART RATE: 63 BPM | OXYGEN SATURATION: 98 % | SYSTOLIC BLOOD PRESSURE: 152 MMHG

## 2023-05-08 DIAGNOSIS — Z12.5 PROSTATE CANCER SCREENING: ICD-10-CM

## 2023-05-08 DIAGNOSIS — M10.00 IDIOPATHIC GOUT, UNSPECIFIED CHRONICITY, UNSPECIFIED SITE: ICD-10-CM

## 2023-05-08 DIAGNOSIS — E78.00 PURE HYPERCHOLESTEROLEMIA: ICD-10-CM

## 2023-05-08 DIAGNOSIS — I10 ESSENTIAL HYPERTENSION: Primary | ICD-10-CM

## 2023-05-08 DIAGNOSIS — R00.2 PALPITATION: ICD-10-CM

## 2023-05-08 DIAGNOSIS — I10 ESSENTIAL HYPERTENSION: ICD-10-CM

## 2023-05-08 DIAGNOSIS — K76.0 FATTY LIVER: ICD-10-CM

## 2023-05-08 DIAGNOSIS — N40.1 BPH WITH OBSTRUCTION/LOWER URINARY TRACT SYMPTOMS: ICD-10-CM

## 2023-05-08 DIAGNOSIS — N13.8 BPH WITH OBSTRUCTION/LOWER URINARY TRACT SYMPTOMS: ICD-10-CM

## 2023-05-08 DIAGNOSIS — K64.9 HEMORRHOIDS, UNSPECIFIED HEMORRHOID TYPE: ICD-10-CM

## 2023-05-08 PROBLEM — K62.5 RECTAL BLEEDING: Status: RESOLVED | Noted: 2020-09-11 | Resolved: 2023-05-08

## 2023-05-08 LAB
ALBUMIN SERPL BCP-MCNC: 4.3 G/DL (ref 3.5–5)
ALP SERPL-CCNC: 66 U/L (ref 46–116)
ALT SERPL W P-5'-P-CCNC: 33 U/L (ref 12–78)
ANION GAP SERPL CALCULATED.3IONS-SCNC: 2 MMOL/L (ref 4–13)
AST SERPL W P-5'-P-CCNC: 25 U/L (ref 5–45)
BASOPHILS # BLD AUTO: 0.04 THOUSANDS/ÂΜL (ref 0–0.1)
BASOPHILS NFR BLD AUTO: 1 % (ref 0–1)
BILIRUB SERPL-MCNC: 0.86 MG/DL (ref 0.2–1)
BUN SERPL-MCNC: 17 MG/DL (ref 5–25)
CALCIUM SERPL-MCNC: 10 MG/DL (ref 8.3–10.1)
CHLORIDE SERPL-SCNC: 105 MMOL/L (ref 96–108)
CHOLEST SERPL-MCNC: 186 MG/DL
CO2 SERPL-SCNC: 31 MMOL/L (ref 21–32)
CREAT SERPL-MCNC: 1.15 MG/DL (ref 0.6–1.3)
EOSINOPHIL # BLD AUTO: 0.12 THOUSAND/ÂΜL (ref 0–0.61)
EOSINOPHIL NFR BLD AUTO: 2 % (ref 0–6)
ERYTHROCYTE [DISTWIDTH] IN BLOOD BY AUTOMATED COUNT: 12.4 % (ref 11.6–15.1)
GFR SERPL CREATININE-BSD FRML MDRD: 69 ML/MIN/1.73SQ M
GLUCOSE P FAST SERPL-MCNC: 103 MG/DL (ref 65–99)
HCT VFR BLD AUTO: 48.1 % (ref 36.5–49.3)
HDLC SERPL-MCNC: 44 MG/DL
HGB BLD-MCNC: 15.7 G/DL (ref 12–17)
IMM GRANULOCYTES # BLD AUTO: 0.01 THOUSAND/UL (ref 0–0.2)
IMM GRANULOCYTES NFR BLD AUTO: 0 % (ref 0–2)
LDLC SERPL CALC-MCNC: 106 MG/DL (ref 0–100)
LYMPHOCYTES # BLD AUTO: 1.71 THOUSANDS/ÂΜL (ref 0.6–4.47)
LYMPHOCYTES NFR BLD AUTO: 29 % (ref 14–44)
MCH RBC QN AUTO: 29.2 PG (ref 26.8–34.3)
MCHC RBC AUTO-ENTMCNC: 32.6 G/DL (ref 31.4–37.4)
MCV RBC AUTO: 89 FL (ref 82–98)
MONOCYTES # BLD AUTO: 0.58 THOUSAND/ÂΜL (ref 0.17–1.22)
MONOCYTES NFR BLD AUTO: 10 % (ref 4–12)
NEUTROPHILS # BLD AUTO: 3.36 THOUSANDS/ÂΜL (ref 1.85–7.62)
NEUTS SEG NFR BLD AUTO: 58 % (ref 43–75)
NRBC BLD AUTO-RTO: 0 /100 WBCS
PLATELET # BLD AUTO: 216 THOUSANDS/UL (ref 149–390)
PMV BLD AUTO: 10.7 FL (ref 8.9–12.7)
POTASSIUM SERPL-SCNC: 4.1 MMOL/L (ref 3.5–5.3)
PROT SERPL-MCNC: 7.9 G/DL (ref 6.4–8.4)
PSA SERPL-MCNC: 0.7 NG/ML (ref 0–4)
RBC # BLD AUTO: 5.38 MILLION/UL (ref 3.88–5.62)
SODIUM SERPL-SCNC: 138 MMOL/L (ref 135–147)
TRIGL SERPL-MCNC: 182 MG/DL
URATE SERPL-MCNC: 8.1 MG/DL (ref 3.5–8.5)
WBC # BLD AUTO: 5.82 THOUSAND/UL (ref 4.31–10.16)

## 2023-05-08 RX ORDER — LOSARTAN POTASSIUM 25 MG/1
25 TABLET ORAL DAILY
Qty: 90 TABLET | Refills: 3 | Status: SHIPPED | OUTPATIENT
Start: 2023-05-08

## 2023-05-08 NOTE — PROGRESS NOTES
"Assessment/Plan:       Problem List Items Addressed This Visit        Digestive    Fatty liver     Continue routine exercise  Relevant Orders    Comprehensive metabolic panel (Completed)    Hemorrhoids     Stable at this time  Cardiovascular and Mediastinum    Essential hypertension - Primary     Blood pressure is poorly controlled at this point  Add small dose of losartan 25 mg  Monitor closely for lightheadedness as he did have some lightheadedness in the past on ARB therapy  Relevant Medications    losartan (COZAAR) 25 mg tablet    Other Relevant Orders    CBC and differential (Completed)    Comprehensive metabolic panel (Completed)    Lipid Panel with Direct LDL reflex (Completed)    POCT ECG (Completed)       Genitourinary    BPH with obstruction/lower urinary tract symptoms     Stable at this time  He would like to continue with prostate cancer screening  Other    Gout    Relevant Orders    Uric acid (Completed)    Pure hypercholesterolemia    Relevant Orders    Lipid Panel with Direct LDL reflex (Completed)    CT coronary calcium score    Palpitation     EKG looked okay today         Relevant Orders    POCT ECG (Completed)   Other Visit Diagnoses     Prostate cancer screening        Relevant Orders    PSA, Total Screen (Completed)            Subjective:      Patient ID: Drinda Sandifer is a 59 y o  male  HPI  Patient presents today for follow-up of chronic health issues  He has not had blood work done in several years  He is only been following up intermittently  He denies any ponds or chest pain or significant lightheadedness but he has noted some occasional \"skipped heartbeats\" he mostly feels these when he checks his pulse  He denies any associated lightheadedness  They tend to resolve after 1 or 2 beats        The following portions of the patient's history were reviewed and updated as appropriate: allergies, current medications, past family history, past " "medical history, past social history, past surgical history and problem list       Current Outpatient Medications:   •  atorvastatin (LIPITOR) 20 mg tablet, Take 1 tablet (20 mg total) by mouth daily, Disp: 90 tablet, Rfl: 0  •  hydrochlorothiazide (HYDRODIURIL) 25 mg tablet, Take 1 tablet (25 mg total) by mouth daily, Disp: 90 tablet, Rfl: 3  •  losartan (COZAAR) 25 mg tablet, Take 1 tablet (25 mg total) by mouth daily, Disp: 90 tablet, Rfl: 3  •  sildenafil (VIAGRA) 100 mg tablet, Take 1/2 or 1 tab po prn, Disp: 10 tablet, Rfl: 3     Review of Systems   Constitutional: Negative for appetite change, chills, fatigue, fever and unexpected weight change  HENT: Negative for trouble swallowing  Eyes: Negative for visual disturbance  Respiratory: Negative for cough, chest tightness, shortness of breath and wheezing  Cardiovascular: Negative for chest pain, palpitations and leg swelling  Gastrointestinal: Negative for abdominal distention, abdominal pain, blood in stool, constipation and diarrhea  He has some occasional pain along the right flank/right lower rib cage  This seems to be positional and improves with twisting to the left  Endocrine: Negative for polyuria  Genitourinary: Negative for difficulty urinating and flank pain  Musculoskeletal: Negative for arthralgias and myalgias  Skin: Negative for rash  Neurological: Negative for dizziness and light-headedness  Hematological: Negative for adenopathy  Does not bruise/bleed easily  Psychiatric/Behavioral: Negative for dysphoric mood and sleep disturbance  The patient is not nervous/anxious  Objective:      /98 (BP Location: Left arm, Patient Position: Sitting, Cuff Size: Large)   Pulse 63   Ht 5' 10\" (1 778 m)   Wt 93 7 kg (206 lb 9 6 oz)   SpO2 98%   BMI 29 64 kg/m²          Physical Exam  Vitals reviewed  Constitutional:       Appearance: He is well-developed  HENT:      Head: Normocephalic   " Cardiovascular:      Rate and Rhythm: Regular rhythm  Heart sounds: Normal heart sounds  No murmur heard  Pulmonary:      Effort: No respiratory distress  Breath sounds: No wheezing or rales  Abdominal:      General: There is no distension  Tenderness: There is no abdominal tenderness  Skin:     Findings: No erythema or rash  Neurological:      Mental Status: He is alert and oriented to person, place, and time             Shelly Osman MD

## 2023-05-09 NOTE — ASSESSMENT & PLAN NOTE
Blood pressure is poorly controlled at this point  Add small dose of losartan 25 mg  Monitor closely for lightheadedness as he did have some lightheadedness in the past on ARB therapy

## 2023-05-18 ENCOUNTER — HOSPITAL ENCOUNTER (OUTPATIENT)
Dept: CT IMAGING | Facility: HOSPITAL | Age: 58
Discharge: HOME | End: 2023-05-18

## 2023-05-18 DIAGNOSIS — E78.00 PURE HYPERCHOLESTEROLEMIA: ICD-10-CM

## 2023-07-16 DIAGNOSIS — E78.00 PURE HYPERCHOLESTEROLEMIA: ICD-10-CM

## 2023-07-16 DIAGNOSIS — N52.9 ERECTILE DYSFUNCTION, UNSPECIFIED ERECTILE DYSFUNCTION TYPE: ICD-10-CM

## 2023-07-16 RX ORDER — ATORVASTATIN CALCIUM 20 MG/1
TABLET, FILM COATED ORAL
Qty: 30 TABLET | Refills: 2 | Status: SHIPPED | OUTPATIENT
Start: 2023-07-16

## 2023-07-17 RX ORDER — SILDENAFIL 100 MG/1
TABLET, FILM COATED ORAL
Qty: 6 TABLET | Refills: 6 | Status: SHIPPED | OUTPATIENT
Start: 2023-07-17

## 2023-08-15 DIAGNOSIS — E78.00 PURE HYPERCHOLESTEROLEMIA: ICD-10-CM

## 2023-08-15 RX ORDER — ATORVASTATIN CALCIUM 20 MG/1
TABLET, FILM COATED ORAL
Qty: 90 TABLET | Refills: 1 | Status: SHIPPED | OUTPATIENT
Start: 2023-08-15

## 2023-12-22 ENCOUNTER — OFFICE VISIT (OUTPATIENT)
Dept: FAMILY MEDICINE CLINIC | Facility: CLINIC | Age: 58
End: 2023-12-22
Payer: COMMERCIAL

## 2023-12-22 VITALS
RESPIRATION RATE: 14 BRPM | SYSTOLIC BLOOD PRESSURE: 168 MMHG | BODY MASS INDEX: 30.29 KG/M2 | HEIGHT: 70 IN | DIASTOLIC BLOOD PRESSURE: 102 MMHG | HEART RATE: 79 BPM | TEMPERATURE: 97 F | OXYGEN SATURATION: 99 % | WEIGHT: 211.6 LBS

## 2023-12-22 DIAGNOSIS — R73.01 IFG (IMPAIRED FASTING GLUCOSE): ICD-10-CM

## 2023-12-22 DIAGNOSIS — I10 ESSENTIAL HYPERTENSION: Primary | ICD-10-CM

## 2023-12-22 DIAGNOSIS — I10 ESSENTIAL HYPERTENSION: ICD-10-CM

## 2023-12-22 PROCEDURE — 99213 OFFICE O/P EST LOW 20 MIN: CPT | Performed by: PHYSICIAN ASSISTANT

## 2023-12-22 RX ORDER — LOSARTAN POTASSIUM 50 MG/1
50 TABLET ORAL DAILY
Qty: 30 TABLET | Refills: 0 | Status: SHIPPED | OUTPATIENT
Start: 2023-12-22

## 2023-12-22 RX ORDER — HYDROCHLOROTHIAZIDE 25 MG/1
25 TABLET ORAL DAILY
Qty: 90 TABLET | Refills: 1 | Status: SHIPPED | OUTPATIENT
Start: 2023-12-22

## 2023-12-22 NOTE — PROGRESS NOTES
FAMILY PRACTICE ACUTE OFFICE VISIT  [facility] - [facility]       NAME: Cj Smith  AGE: 58 y.o. SEX: male       : 1965        MRN: [id number]    DATE: 2023  TIME: 11:56 AM    Assessment and Plan     Problem List Items Addressed This Visit          Endocrine    IFG (impaired fasting glucose)     Recheck with labs as ordered.           Relevant Orders    Basic metabolic panel    Hemoglobin A1C    Insulin, fasting       Cardiovascular and Mediastinum    Essential hypertension - Primary     Uncontrolled.  Continue hydrochlorothiazide 25 mg daily and increase losartan to 50 mg daily.  Check BMP in 2 weeks and follow-up in 3 weeks. Limit intake of caffeine and salt.  Call with any concerns in the interim.         Relevant Medications    losartan (COZAAR) 50 mg tablet    Other Relevant Orders    Basic metabolic panel       Essential hypertension  Uncontrolled.  Continue hydrochlorothiazide 25 mg daily and increase losartan to 50 mg daily.  Check BMP in 2 weeks and follow-up in 3 weeks. Limit intake of caffeine and salt.  Call with any concerns in the interim.    IFG (impaired fasting glucose)  Recheck with labs as ordered.        There are no Patient Instructions on file for this visit.        Chief Complaint     Chief Complaint   Patient presents with    bp spiking       History of Present Illness   Cj Smith is a 58 y.o.-year-old male who presents for spiking blood pressure.     Notes that the BP has been creeping up for a few months but over the last few weeks he has had trouble with not being able to get it down - notes that he was initially seeing it up to 140/90 but now more like 180/104 - notes that he has started feeling pressure in the back of the neck - denies chest pain or shortness of breath           Review of Systems   Review of Systems   Eyes:  Negative for visual disturbance.   Respiratory:  Negative for shortness of breath.   "  Cardiovascular:  Negative for chest pain.   Psychiatric/Behavioral:  Positive for decreased concentration (over the last 2 weeks).        Active Problem List     Patient Active Problem List   Diagnosis    Gout    Pure hypercholesterolemia    Essential hypertension    Tendinitis of left rotator cuff    ED (erectile dysfunction)    BPH with obstruction/lower urinary tract symptoms    Calculus of kidney    Impingement syndrome of shoulder region    Fatty liver    Trigger finger of right thumb    Hemorrhoids    Palpitation    IFG (impaired fasting glucose)         Social History:  Social History     Socioeconomic History    Marital status:      Spouse name: Not on file    Number of children: Not on file    Years of education: Not on file    Highest education level: Not on file   Occupational History    Occupation: Sales   Tobacco Use    Smoking status: Never    Smokeless tobacco: Never   Substance and Sexual Activity    Alcohol use: Yes    Drug use: No    Sexual activity: Not on file   Other Topics Concern    Not on file   Social History Narrative    Not on file     Social Determinants of Health     Financial Resource Strain: Not on file   Food Insecurity: Not on file   Transportation Needs: Not on file   Physical Activity: Not on file   Stress: Not on file   Social Connections: Not on file   Intimate Partner Violence: Not on file   Housing Stability: Not on file       Objective     Vitals:    12/22/23 1516 12/22/23 1539   BP: (!) 180/100 (!) 168/102   BP Location: Left arm    Patient Position: Sitting    Cuff Size: Large    Pulse: 79    Resp: 14    Temp: (!) 97 °F (36.1 °C)    TempSrc: Temporal    SpO2: 99%    Weight: 96 kg (211 lb 9.6 oz)    Height: 5' 10\" (1.778 m)      Wt Readings from Last 3 Encounters:   12/22/23 96 kg (211 lb 9.6 oz)   05/08/23 93.7 kg (206 lb 9.6 oz)   04/04/22 96.3 kg (212 lb 6.4 oz)       Physical Exam  Vitals reviewed.   Constitutional:       General: He is not in acute distress.    "  Appearance: Normal appearance. He is well-developed. He is not ill-appearing.   HENT:      Head: Normocephalic and atraumatic.   Neck:      Thyroid: No thyromegaly.   Cardiovascular:      Rate and Rhythm: Normal rate and regular rhythm.      Pulses: Normal pulses.      Heart sounds: Normal heart sounds. No murmur heard.     Comments: No carotid bruits noted  Pulmonary:      Effort: Pulmonary effort is normal.      Breath sounds: Normal breath sounds. No wheezing, rhonchi or rales.   Musculoskeletal:      Cervical back: Neck supple.      Right lower leg: No edema.      Left lower leg: No edema.   Lymphadenopathy:      Cervical: No cervical adenopathy.   Neurological:      Mental Status: He is alert.   Psychiatric:         Mood and Affect: Mood normal.         Behavior: Behavior normal.         Thought Content: Thought content normal.         Judgment: Judgment normal.         Pertinent Laboratory/Diagnostic Studies:  Results for orders placed or performed in visit on 05/08/23   CBC and differential   Result Value Ref Range    WBC 5.82 4.31 - 10.16 Thousand/uL    RBC 5.38 3.88 - 5.62 Million/uL    Hemoglobin 15.7 12.0 - 17.0 g/dL    Hematocrit 48.1 36.5 - 49.3 %    MCV 89 82 - 98 fL    MCH 29.2 26.8 - 34.3 pg    MCHC 32.6 31.4 - 37.4 g/dL    RDW 12.4 11.6 - 15.1 %    MPV 10.7 8.9 - 12.7 fL    Platelets 216 149 - 390 Thousands/uL    nRBC 0 /100 WBCs    Neutrophils Relative 58 43 - 75 %    Immat GRANS % 0 0 - 2 %    Lymphocytes Relative 29 14 - 44 %    Monocytes Relative 10 4 - 12 %    Eosinophils Relative 2 0 - 6 %    Basophils Relative 1 0 - 1 %    Neutrophils Absolute 3.36 1.85 - 7.62 Thousands/µL    Immature Grans Absolute 0.01 0.00 - 0.20 Thousand/uL    Lymphocytes Absolute 1.71 0.60 - 4.47 Thousands/µL    Monocytes Absolute 0.58 0.17 - 1.22 Thousand/µL    Eosinophils Absolute 0.12 0.00 - 0.61 Thousand/µL    Basophils Absolute 0.04 0.00 - 0.10 Thousands/µL   Comprehensive metabolic panel   Result Value Ref Range     Sodium 138 135 - 147 mmol/L    Potassium 4.1 3.5 - 5.3 mmol/L    Chloride 105 96 - 108 mmol/L    CO2 31 21 - 32 mmol/L    ANION GAP 2 (L) 4 - 13 mmol/L    BUN 17 5 - 25 mg/dL    Creatinine 1.15 0.60 - 1.30 mg/dL    Glucose, Fasting 103 (H) 65 - 99 mg/dL    Calcium 10.0 8.3 - 10.1 mg/dL    AST 25 5 - 45 U/L    ALT 33 12 - 78 U/L    Alkaline Phosphatase 66 46 - 116 U/L    Total Protein 7.9 6.4 - 8.4 g/dL    Albumin 4.3 3.5 - 5.0 g/dL    Total Bilirubin 0.86 0.20 - 1.00 mg/dL    eGFR 69 ml/min/1.73sq m   Lipid Panel with Direct LDL reflex   Result Value Ref Range    Cholesterol 186 See Comment mg/dL    Triglycerides 182 (H) See Comment mg/dL    HDL, Direct 44 >=40 mg/dL    LDL Calculated 106 (H) 0 - 100 mg/dL   Uric acid   Result Value Ref Range    Uric Acid 8.1 3.5 - 8.5 mg/dL   PSA, Total Screen   Result Value Ref Range    PSA 0.7 0.0 - 4.0 ng/mL           ALLERGIES:  No Known Allergies    Current Medications     Current Outpatient Medications   Medication Sig Dispense Refill    atorvastatin (LIPITOR) 20 mg tablet TAKE 1 TABLET BY MOUTH EVERY DAY 90 tablet 1    hydrochlorothiazide (HYDRODIURIL) 25 mg tablet TAKE 1 TABLET (25 MG TOTAL) BY MOUTH DAILY. 90 tablet 1    losartan (COZAAR) 50 mg tablet Take 1 tablet (50 mg total) by mouth daily 30 tablet 0     No current facility-administered medications for this visit.         Negar Shah PA-C  12/24/2023 11:56 AM  [facility]

## 2023-12-24 PROBLEM — R73.01 IFG (IMPAIRED FASTING GLUCOSE): Status: ACTIVE | Noted: 2023-12-24

## 2023-12-24 NOTE — ASSESSMENT & PLAN NOTE
Uncontrolled.  Continue hydrochlorothiazide 25 mg daily and increase losartan to 50 mg daily.  Check BMP in 2 weeks and follow-up in 3 weeks. Limit intake of caffeine and salt.  Call with any concerns in the interim.

## 2024-01-15 DIAGNOSIS — I10 ESSENTIAL HYPERTENSION: ICD-10-CM

## 2024-01-15 RX ORDER — LOSARTAN POTASSIUM 50 MG/1
50 TABLET ORAL DAILY
Qty: 90 TABLET | Refills: 1 | Status: SHIPPED | OUTPATIENT
Start: 2024-01-15

## 2024-02-27 DIAGNOSIS — E78.00 PURE HYPERCHOLESTEROLEMIA: ICD-10-CM

## 2024-02-27 RX ORDER — ATORVASTATIN CALCIUM 20 MG/1
TABLET, FILM COATED ORAL
Qty: 30 TABLET | Refills: 5 | Status: SHIPPED | OUTPATIENT
Start: 2024-02-27

## 2024-03-11 ENCOUNTER — OFFICE VISIT (OUTPATIENT)
Dept: FAMILY MEDICINE CLINIC | Facility: CLINIC | Age: 59
End: 2024-03-11
Payer: COMMERCIAL

## 2024-03-11 VITALS
WEIGHT: 211 LBS | HEART RATE: 82 BPM | SYSTOLIC BLOOD PRESSURE: 130 MMHG | BODY MASS INDEX: 30.28 KG/M2 | RESPIRATION RATE: 16 BRPM | TEMPERATURE: 98.5 F | DIASTOLIC BLOOD PRESSURE: 82 MMHG | OXYGEN SATURATION: 96 %

## 2024-03-11 DIAGNOSIS — K76.0 FATTY LIVER: ICD-10-CM

## 2024-03-11 DIAGNOSIS — R73.01 IFG (IMPAIRED FASTING GLUCOSE): ICD-10-CM

## 2024-03-11 DIAGNOSIS — I10 ESSENTIAL HYPERTENSION: ICD-10-CM

## 2024-03-11 DIAGNOSIS — R09.81 NASAL CONGESTION: ICD-10-CM

## 2024-03-11 DIAGNOSIS — N13.8 BPH WITH OBSTRUCTION/LOWER URINARY TRACT SYMPTOMS: ICD-10-CM

## 2024-03-11 DIAGNOSIS — E78.00 PURE HYPERCHOLESTEROLEMIA: Primary | ICD-10-CM

## 2024-03-11 DIAGNOSIS — N40.1 BPH WITH OBSTRUCTION/LOWER URINARY TRACT SYMPTOMS: ICD-10-CM

## 2024-03-11 DIAGNOSIS — M10.00 IDIOPATHIC GOUT, UNSPECIFIED CHRONICITY, UNSPECIFIED SITE: ICD-10-CM

## 2024-03-11 PROBLEM — R00.2 PALPITATION: Status: RESOLVED | Noted: 2023-05-08 | Resolved: 2024-03-11

## 2024-03-11 PROCEDURE — 99214 OFFICE O/P EST MOD 30 MIN: CPT | Performed by: FAMILY MEDICINE

## 2024-03-11 RX ORDER — FLUTICASONE PROPIONATE 50 MCG
2 SPRAY, SUSPENSION (ML) NASAL DAILY
Qty: 48 G | Refills: 3 | Status: SHIPPED | OUTPATIENT
Start: 2024-03-11

## 2024-03-11 RX ORDER — ATORVASTATIN CALCIUM 20 MG/1
10 TABLET, FILM COATED ORAL DAILY
Start: 2024-03-11

## 2024-03-11 RX ORDER — LOSARTAN POTASSIUM 100 MG/1
100 TABLET ORAL DAILY
Qty: 90 TABLET | Refills: 3 | Status: SHIPPED | OUTPATIENT
Start: 2024-03-11

## 2024-03-11 NOTE — ASSESSMENT & PLAN NOTE
Blood pressure remains quite labile.  He has multiple readings at home that are in the 160s but also many that are well-controlled.  He is going to work on some weight loss.  In the meantime, I am going to bump up his losartan 100 mg daily.  Blood pressures here range from 130/82 while seated to 176/86 while standing.

## 2024-03-11 NOTE — PROGRESS NOTES
Assessment/Plan:       Problem List Items Addressed This Visit        Digestive    Fatty liver    Relevant Orders    CBC and differential    Comprehensive metabolic panel    Lipid Panel with Direct LDL reflex       Endocrine    IFG (impaired fasting glucose)    Relevant Orders    CBC and differential    Comprehensive metabolic panel    Hemoglobin A1C       Cardiovascular and Mediastinum    Essential hypertension     Blood pressure remains quite labile.  He has multiple readings at home that are in the 160s but also many that are well-controlled.  He is going to work on some weight loss.  In the meantime, I am going to bump up his losartan 100 mg daily.  Blood pressures here range from 130/82 while seated to 176/86 while standing.         Relevant Medications    losartan (COZAAR) 100 MG tablet    Other Relevant Orders    VAS screening       Genitourinary    BPH with obstruction/lower urinary tract symptoms       Other    Gout    Relevant Orders    Uric acid    Pure hypercholesterolemia - Primary     Check labs.  He is only taking 10 mg daily.         Relevant Medications    atorvastatin (LIPITOR) 20 mg tablet    Other Relevant Orders    Comprehensive metabolic panel    Lipid Panel with Direct LDL reflex    VAS screening    Nasal congestion    Relevant Medications    fluticasone (FLONASE) 50 mcg/act nasal spray         Subjective:      Patient ID: Cj Smith is a 58 y.o. male.    Patient presents today for follow-up of chronic health issues.  His main issue is his hypertension.  Blood pressure has been labile when he checks at home.  He notes sometimes will be 120/80 but other times he will get pressures in the 170s/80s.  He denies any headache, vision, chest pain or shortness of breath.  He remains active with walking for exercise pretty routinely and has had no exertional issues.  He did cut down his atorvastatin to 10 mg as he was concerned about being on the higher dose.        The following portions of the  patient's history were reviewed and updated as appropriate: allergies, current medications, past family history, past medical history, past social history, past surgical history and problem list.      Current Outpatient Medications:   •  atorvastatin (LIPITOR) 20 mg tablet, Take 0.5 tablets (10 mg total) by mouth daily, Disp: , Rfl:   •  fluticasone (FLONASE) 50 mcg/act nasal spray, 2 sprays into each nostril daily, Disp: 48 g, Rfl: 3  •  hydrochlorothiazide (HYDRODIURIL) 25 mg tablet, TAKE 1 TABLET (25 MG TOTAL) BY MOUTH DAILY., Disp: 90 tablet, Rfl: 1  •  losartan (COZAAR) 100 MG tablet, Take 1 tablet (100 mg total) by mouth daily, Disp: 90 tablet, Rfl: 3     Review of Systems   Constitutional:  Negative for appetite change, chills, fatigue, fever and unexpected weight change.   HENT:  Negative for trouble swallowing.    Eyes:  Negative for visual disturbance.   Respiratory:  Negative for cough, chest tightness, shortness of breath and wheezing.    Cardiovascular:  Negative for chest pain, palpitations and leg swelling.   Gastrointestinal:  Negative for abdominal distention, abdominal pain, blood in stool, constipation and diarrhea.   Endocrine: Negative for polyuria.   Genitourinary:  Negative for difficulty urinating and flank pain.   Musculoskeletal:  Negative for arthralgias and myalgias.   Skin:  Negative for rash.   Neurological:  Negative for dizziness and light-headedness.   Hematological:  Negative for adenopathy. Does not bruise/bleed easily.   Psychiatric/Behavioral:  Negative for dysphoric mood and sleep disturbance. The patient is not nervous/anxious.          Objective:      /82 (BP Location: Left arm, Patient Position: Sitting, Cuff Size: Large)   Pulse 82   Temp 98.5 °F (36.9 °C) (Tympanic)   Resp 16   Wt 95.7 kg (211 lb)   SpO2 96%   BMI 30.28 kg/m²          Physical Exam  Vitals reviewed.   Constitutional:       General: He is not in acute distress.     Appearance: He is  well-developed. He is not diaphoretic.   HENT:      Head: Normocephalic.   Eyes:      General:         Right eye: No discharge.         Left eye: No discharge.      Pupils: Pupils are equal, round, and reactive to light.   Neck:      Thyroid: No thyromegaly.      Trachea: No tracheal deviation.   Cardiovascular:      Rate and Rhythm: Normal rate and regular rhythm.      Heart sounds: Normal heart sounds. No murmur heard.  Pulmonary:      Effort: Pulmonary effort is normal. No respiratory distress.      Breath sounds: No wheezing or rales.   Abdominal:      General: There is no distension.      Palpations: Abdomen is soft.      Tenderness: There is no abdominal tenderness.   Musculoskeletal:         General: Normal range of motion.      Right lower leg: No edema.      Left lower leg: No edema.   Lymphadenopathy:      Cervical: No cervical adenopathy.   Skin:     General: Skin is warm.      Findings: No erythema.   Neurological:      General: No focal deficit present.      Mental Status: He is alert and oriented to person, place, and time.      Gait: Gait normal.   Psychiatric:         Thought Content: Thought content normal.         Judgment: Judgment normal.           Kamran James Jr, MD

## 2024-03-20 ENCOUNTER — TELEPHONE (OUTPATIENT)
Age: 59
End: 2024-03-20

## 2024-03-20 ENCOUNTER — HOSPITAL ENCOUNTER (OUTPATIENT)
Dept: NON INVASIVE DIAGNOSTICS | Facility: CLINIC | Age: 59
Discharge: HOME | End: 2024-03-20
Payer: COMMERCIAL

## 2024-03-20 DIAGNOSIS — I65.22 LEFT CAROTID ARTERY STENOSIS: Primary | ICD-10-CM

## 2024-03-20 DIAGNOSIS — I10 ESSENTIAL HYPERTENSION: ICD-10-CM

## 2024-03-20 DIAGNOSIS — E78.00 PURE HYPERCHOLESTEROLEMIA: ICD-10-CM

## 2024-03-20 PROCEDURE — 93880 EXTRACRANIAL BILAT STUDY: CPT | Performed by: SURGERY

## 2024-03-20 PROCEDURE — 93922 UPR/L XTREMITY ART 2 LEVELS: CPT | Performed by: SURGERY

## 2024-03-20 PROCEDURE — 93922 UPR/L XTREMITY ART 2 LEVELS: CPT

## 2024-03-20 PROCEDURE — 93978 VASCULAR STUDY: CPT | Performed by: SURGERY

## 2024-03-20 NOTE — TELEPHONE ENCOUNTER
Set up stat duplex and a vascular surgery consult. I will call the patient.     Problem List Items Addressed This Visit          Cardiovascular and Mediastinum    Left carotid artery stenosis - Primary    Relevant Orders    VAS carotid complete study    Ambulatory Referral to Vascular Surgery

## 2024-03-20 NOTE — TELEPHONE ENCOUNTER
Lesa PAPPAS called to inform Pt had a vascular screening today and the report shows critical findings. His L carotid artery was 70-99%. The R is less than 50%.

## 2024-03-20 NOTE — TELEPHONE ENCOUNTER
Patient had a vascular screening today and the lab called to report critical findings.  His L carotid artery was 70-99%. The R is less than 50%. Office clinical staff aware and will inform PCP.

## 2024-03-22 ENCOUNTER — TELEPHONE (OUTPATIENT)
Dept: VASCULAR SURGERY | Facility: CLINIC | Age: 59
End: 2024-03-22

## 2024-03-22 ENCOUNTER — CONSULT (OUTPATIENT)
Dept: VASCULAR SURGERY | Facility: CLINIC | Age: 59
End: 2024-03-22
Payer: COMMERCIAL

## 2024-03-22 VITALS
BODY MASS INDEX: 29.63 KG/M2 | HEIGHT: 70 IN | OXYGEN SATURATION: 96 % | WEIGHT: 207 LBS | HEART RATE: 88 BPM | DIASTOLIC BLOOD PRESSURE: 88 MMHG | SYSTOLIC BLOOD PRESSURE: 140 MMHG

## 2024-03-22 DIAGNOSIS — I65.22 LEFT CAROTID ARTERY STENOSIS: Primary | ICD-10-CM

## 2024-03-22 DIAGNOSIS — E78.00 PURE HYPERCHOLESTEROLEMIA: ICD-10-CM

## 2024-03-22 DIAGNOSIS — I10 ESSENTIAL HYPERTENSION: ICD-10-CM

## 2024-03-22 PROCEDURE — 99203 OFFICE O/P NEW LOW 30 MIN: CPT

## 2024-03-22 RX ORDER — ASPIRIN 81 MG/1
81 TABLET, CHEWABLE ORAL DAILY
Start: 2024-03-22

## 2024-03-22 NOTE — TELEPHONE ENCOUNTER
PT wants to wait on f/u OV with surgeon after CT, said he has his own doctor, will give us a call back 3/22/24

## 2024-03-22 NOTE — ASSESSMENT & PLAN NOTE
Patient reports labile blood pressures over the last year. Blood pressure is mildly elevated in the office today, 140/88.    -Continue medical management per PCP.  -Low salt diet.

## 2024-03-22 NOTE — PROGRESS NOTES
Assessment/Plan:    Left carotid artery stenosis  Patient reports forgetfulness, fatigue, dizziness, and slurred speech that occurs when he is tired.  The symptoms have been worsening over the last year. He also reports intermittent headaches associated with elevated blood pressure.  Patient is currently denying any amaurosis fugax, unilateral weakness, dysphagia, or facial droop.  Patient has never been a smoker.  He is active and independent with ADLs.    Imaging:  Vascular screening 3/20/24:  Severe proximal left ICA stenosis (//ratio 4.17). <50% stenosis right ICA.    Plan:  -We discussed the pathophysiology of carotid artery stenosis as well as contributing lifestyle factors.  -We discussed the results of vascular screening at length. Severe stenosis to left ICA.  -Continue atorvastatin 20mg daily. Recommended initiating aspirin 81mg daily.  -Order placed for CTA head and neck to further evaluate L ICA stenosis. Order placed to check BMP at least 3 days prior to study.  -Instructed patient to report to the ED for any TIA/CVA symptoms   -Follow up in the office with surgeon after CTA to discuss results.        Essential hypertension  Patient reports labile blood pressures over the last year. Blood pressure is mildly elevated in the office today, 140/88.    -Continue medical management per PCP.  -Low salt diet.    Pure hypercholesterolemia  Continue atorvastatin.       Diagnoses and all orders for this visit:    Left carotid artery stenosis  -     Ambulatory Referral to Vascular Surgery  -     CTA head and neck w wo contrast; Future  -     Basic metabolic panel; Future  -     aspirin 81 mg chewable tablet; Chew 1 tablet (81 mg total) daily    Essential hypertension    Pure hypercholesterolemia          Subjective:      Patient ID: Cj Smith is a 58 y.o. male.    Patient is a 58-year-old male, non-smoker, with PMH HTN, HLD, and gout.  Patient is presenting to the vascular surgery office upon  "referral by his PCP to discuss results of vascular screening completed 3/20/2024.    Patient underwent vascular surgery screening as recommended by his PCP due labile blood pressures over the last year.  Patient reports forgetfulness, fatigue, dizziness, and slurred speech that occurs when he is tired.  The symptoms have been worsening over the last year. He also reports intermittent headaches associated with elevated blood pressure.  Patient is currently denying any amaurosis fugax, unilateral weakness, dysphagia, or facial droop.  Patient has never been a smoker.  He is active and independent with ADLs.        The following portions of the patient's history were reviewed and updated as appropriate: allergies, current medications, past family history, past medical history, past social history, past surgical history, and problem list.    Review of Systems   Constitutional: Negative.  Negative for activity change.   HENT: Negative.  Negative for trouble swallowing.    Eyes: Negative.  Negative for visual disturbance.   Respiratory: Negative.  Negative for shortness of breath.    Cardiovascular: Negative.  Negative for chest pain.   Gastrointestinal: Negative.    Endocrine: Negative.    Genitourinary: Negative.    Musculoskeletal: Negative.    Skin: Negative.    Allergic/Immunologic: Negative.    Neurological:  Positive for dizziness, speech difficulty and headaches. Negative for facial asymmetry, weakness and numbness.   Hematological: Negative.    Psychiatric/Behavioral: Negative.           Objective:      /88 (BP Location: Left arm, Patient Position: Sitting, Cuff Size: Large)   Pulse 88   Ht 5' 10\" (1.778 m)   Wt 93.9 kg (207 lb)   SpO2 96%   BMI 29.70 kg/m²        Physical Exam  Constitutional:       General: He is not in acute distress.     Appearance: Normal appearance.   HENT:      Head: Normocephalic and atraumatic.   Neck:      Vascular: No carotid bruit.   Cardiovascular:      Rate and Rhythm: " "Normal rate and regular rhythm.      Pulses:           Radial pulses are 2+ on the right side and 2+ on the left side.      Heart sounds: Normal heart sounds. No murmur heard.  Pulmonary:      Effort: Pulmonary effort is normal. No respiratory distress.      Breath sounds: Normal breath sounds.   Musculoskeletal:         General: No swelling or tenderness.      Cervical back: Normal range of motion and neck supple. No tenderness.      Right lower leg: No edema.      Left lower leg: No edema.   Skin:     General: Skin is warm and dry.      Capillary Refill: Capillary refill takes less than 2 seconds.   Neurological:      General: No focal deficit present.      Mental Status: He is alert and oriented to person, place, and time.      Cranial Nerves: No cranial nerve deficit, dysarthria or facial asymmetry.      Sensory: Sensation is intact.      Motor: Motor function is intact. No weakness.   Psychiatric:         Mood and Affect: Mood normal.         Behavior: Behavior normal.     I have reviewed and made appropriate changes to the review of systems input by the medical assistant.    Vitals:    03/22/24 1023 03/22/24 1027   BP: 144/100 140/88   BP Location: Left arm Left arm   Patient Position: Sitting Sitting   Cuff Size: Large Large   Pulse: 88    SpO2: 96%    Weight: 93.9 kg (207 lb)    Height: 5' 10\" (1.778 m)        Patient Active Problem List   Diagnosis    Gout    Pure hypercholesterolemia    Essential hypertension    Tendinitis of left rotator cuff    ED (erectile dysfunction)    BPH with obstruction/lower urinary tract symptoms    Calculus of kidney    Impingement syndrome of shoulder region    Fatty liver    Trigger finger of right thumb    Hemorrhoids    IFG (impaired fasting glucose)    Nasal congestion    Left carotid artery stenosis       Past Surgical History:   Procedure Laterality Date    APPENDECTOMY  2011    MN COLONOSCOPY FLX DX W/COLLJ SPEC WHEN PFRMD N/A 9/20/2016    Procedure: COLONOSCOPY;  " Surgeon: Cesar Murray MD;  Location: [facility];  Service: Gastroenterology       Family History   Problem Relation Age of Onset    Hyperlipidemia Mother     Colon cancer Neg Hx        Social History     Socioeconomic History    Marital status:      Spouse name: Not on file    Number of children: Not on file    Years of education: Not on file    Highest education level: Not on file   Occupational History    Occupation: Sales   Tobacco Use    Smoking status: Never    Smokeless tobacco: Never   Substance and Sexual Activity    Alcohol use: Yes    Drug use: No    Sexual activity: Not on file   Other Topics Concern    Not on file   Social History Narrative    Not on file     Social Determinants of Health     Financial Resource Strain: Not on file   Food Insecurity: Not on file   Transportation Needs: Not on file   Physical Activity: Not on file   Stress: Not on file   Social Connections: Not on file   Intimate Partner Violence: Not on file   Housing Stability: Not on file       No Known Allergies      Current Outpatient Medications:     aspirin 81 mg chewable tablet, Chew 1 tablet (81 mg total) daily, Disp: , Rfl:     atorvastatin (LIPITOR) 20 mg tablet, Take 0.5 tablets (10 mg total) by mouth daily, Disp: , Rfl:     fluticasone (FLONASE) 50 mcg/act nasal spray, 2 sprays into each nostril daily, Disp: 48 g, Rfl: 3    hydrochlorothiazide (HYDRODIURIL) 25 mg tablet, TAKE 1 TABLET (25 MG TOTAL) BY MOUTH DAILY., Disp: 90 tablet, Rfl: 1    losartan (COZAAR) 100 MG tablet, Take 1 tablet (100 mg total) by mouth daily, Disp: 90 tablet, Rfl: 3    I have spent a total time of 30 minutes on 03/22/24 in caring for this patient including Diagnostic results, Prognosis, Risks and benefits of tx options, Instructions for management, Patient and family education, Importance of tx compliance, Risk factor reductions, Impressions, Counseling / Coordination of care, Documenting in the medical record, Reviewing / ordering tests,  medicine, procedures  , and Obtaining or reviewing history  .

## 2024-03-22 NOTE — ASSESSMENT & PLAN NOTE
Patient reports forgetfulness, fatigue, dizziness, and slurred speech that occurs when he is tired.  The symptoms have been worsening over the last year. He also reports intermittent headaches associated with elevated blood pressure.  Patient is currently denying any amaurosis fugax, unilateral weakness, dysphagia, or facial droop.  Patient has never been a smoker.  He is active and independent with ADLs.    Imaging:  Vascular screening 3/20/24:  Severe proximal left ICA stenosis (//ratio 4.17). <50% stenosis right ICA.    Plan:  -We discussed the pathophysiology of carotid artery stenosis as well as contributing lifestyle factors.  -We discussed the results of vascular screening at length. Severe stenosis to left ICA.  -Continue atorvastatin 20mg daily. Recommended initiating aspirin 81mg daily.  -Order placed for CTA head and neck to further evaluate L ICA stenosis. Order placed to check BMP at least 3 days prior to study.  -Instructed patient to report to the ED for any TIA/CVA symptoms   -Follow up in the office with surgeon after CTA to discuss results.

## 2024-03-25 ENCOUNTER — HOSPITAL ENCOUNTER (OUTPATIENT)
Dept: NON INVASIVE DIAGNOSTICS | Facility: CLINIC | Age: 59
Discharge: HOME | End: 2024-03-25
Payer: COMMERCIAL

## 2024-03-25 DIAGNOSIS — I65.22 LEFT CAROTID ARTERY STENOSIS: ICD-10-CM

## 2024-03-25 PROCEDURE — 93880 EXTRACRANIAL BILAT STUDY: CPT

## 2024-03-26 ENCOUNTER — HOSPITAL ENCOUNTER (OUTPATIENT)
Dept: CT IMAGING | Facility: HOSPITAL | Age: 59
Discharge: HOME | End: 2024-03-26
Payer: COMMERCIAL

## 2024-03-26 DIAGNOSIS — I65.22 LEFT CAROTID ARTERY STENOSIS: ICD-10-CM

## 2024-03-26 PROCEDURE — 93880 EXTRACRANIAL BILAT STUDY: CPT | Performed by: SURGERY

## 2024-03-26 PROCEDURE — 70498 CT ANGIOGRAPHY NECK: CPT

## 2024-03-26 PROCEDURE — 70496 CT ANGIOGRAPHY HEAD: CPT

## 2024-03-26 RX ADMIN — IOHEXOL 80 ML: 350 INJECTION, SOLUTION INTRAVENOUS at 17:33

## 2024-04-12 ENCOUNTER — OFFICE VISIT (OUTPATIENT)
Dept: VASCULAR SURGERY | Facility: CLINIC | Age: 59
End: 2024-04-12
Payer: COMMERCIAL

## 2024-04-12 VITALS
WEIGHT: 206 LBS | DIASTOLIC BLOOD PRESSURE: 90 MMHG | HEIGHT: 70 IN | OXYGEN SATURATION: 98 % | SYSTOLIC BLOOD PRESSURE: 142 MMHG | HEART RATE: 75 BPM | BODY MASS INDEX: 29.49 KG/M2

## 2024-04-12 DIAGNOSIS — I65.22 LEFT CAROTID ARTERY STENOSIS: ICD-10-CM

## 2024-04-12 DIAGNOSIS — I65.22 ASYMPTOMATIC CAROTID ARTERY STENOSIS WITHOUT INFARCTION, LEFT: Primary | ICD-10-CM

## 2024-04-12 PROCEDURE — 99213 OFFICE O/P EST LOW 20 MIN: CPT | Performed by: SURGERY

## 2024-04-12 NOTE — PROGRESS NOTES
Assessment/Plan:    Left carotid artery stenosis  Cj is a pleasant 57 yo gentleman who is referred for asymptomatic left carotid stenosis.  He is a nonsmoker  No known family history of stroke.  Carotid duplex velocities suggestive of L 70-99% stenosis with velocities of 387/110 however CTA more consistent with a 70% stenosis. He does have some tortuosity perhaps contributing at least partially to the elevated velocities.  The point of stenosis is focal at origin of ICA with no significant calcified plaque or features that would be worrisome for plaque instability.  We discussed the indications for treatment as it pertains to  asymptomatic versus symptomatic carotid disease.  We discussed the importance of well-controlled blood pressure and management of overall stressors.  There are no  general activity contraindications secondary to his ICA stenosis.  He may continue his usual activities.  Recommend surveillance carotid duplex in 3 months.  ASA/Statin therapy  May benefit from low dose anxiolytic agent to be taken as needed. Will defer to his PCP.  I have provided with my cell number should he have any further questions and or concerns.    We did also review signs/symptoms of stroke. He understands to seek immediate medical attention should he experience any such signs/symptoms.  Of note his daughter Asha (nurse [facility] [employer]) was present during visit.       Diagnoses and all orders for this visit:    Asymptomatic carotid artery stenosis without infarction, left  -     VAS carotid complete study; Future    Left carotid artery stenosis          Subjective:      Patient ID: Cj Smith is a 58 y.o. male.    Patient presents for review of CV done 3/25/24 and CTA head and neck done 3/26/24. He denies TIA/CVA symptoms. He is taking ASA 81 mg and Atorvastatin. He is a non smoker.     Cj is a pleasant 58-year-old gentleman who is referred to the office for asymptomatic left carotid stenosis.  He denies any focal  "neurologic deficits.  He does endorse occasional slurred speech when he is tired.  He also gets occasional posterior cervical pain and posterior headaches.  He admittedly is quite anxious over this new finding of left carotid stenosis.  He has no family history of strokes.  He is a non-smoker.        The following portions of the patient's history were reviewed and updated as appropriate: allergies, current medications, past family history, past medical history, past social history, past surgical history, and problem list.    Review of Systems   Constitutional: Negative.    HENT: Negative.     Eyes: Negative.  Negative for visual disturbance.   Respiratory: Negative.     Cardiovascular: Negative.    Gastrointestinal: Negative.    Endocrine: Negative.    Genitourinary: Negative.    Musculoskeletal: Negative.    Skin: Negative.    Allergic/Immunologic: Negative.    Neurological: Negative.  Negative for dizziness, facial asymmetry, speech difficulty, weakness, light-headedness and headaches.   Hematological: Negative.    Psychiatric/Behavioral: Negative.           Objective:      /90 (BP Location: Left arm, Patient Position: Sitting, Cuff Size: Standard)   Pulse 75   Ht 5' 10\" (1.778 m)   Wt 93.4 kg (206 lb)   SpO2 98%   BMI 29.56 kg/m²          Physical Exam  Constitutional:       General: He is not in acute distress.     Appearance: Normal appearance. He is not ill-appearing, toxic-appearing or diaphoretic.   HENT:      Head: Normocephalic and atraumatic.   Cardiovascular:      Rate and Rhythm: Normal rate.   Pulmonary:      Effort: Pulmonary effort is normal.   Neurological:      Mental Status: He is alert.   Psychiatric:         Behavior: Behavior normal.         Thought Content: Thought content normal.         Judgment: Judgment normal.      Comments: Understandably anxious due to new diagnosis of carotid stenosis and potential associated stroke risk             CTA H & N:  IMPRESSION:     CT brain: " Incidentally noted dilated perivascular space within the inferior right lentiform nucleus. No acute intracranial abnormality.     CT angiography: Atherosclerotic disease of the left carotid bifurcation with short segment stenosis at the ICA origin measuring approximately 70%.     No high-grade stenosis or occlusive disease of the intracranial vasculature.

## 2024-04-12 NOTE — ASSESSMENT & PLAN NOTE
Cj is a pleasant 57 yo gentleman who is referred for asymptomatic left carotid stenosis.  He is a nonsmoker  No known family history of stroke.  Carotid duplex velocities suggestive of L 70-99% stenosis with velocities of 387/110 however CTA more consistent with a 70% stenosis. He does have some tortuosity perhaps contributing at least partially to the elevated velocities.  The point of stenosis is focal at origin of ICA with no significant calcified plaque or features that would be worrisome for plaque instability.  We discussed the indications for treatment as it pertains to  asymptomatic versus symptomatic carotid disease.  We discussed the importance of well-controlled blood pressure and management of overall stressors.  There are no  general activity contraindications secondary to his ICA stenosis.  He may continue his usual activities.  Recommend surveillance carotid duplex in 3 months.  ASA/Statin therapy  May benefit from low dose anxiolytic agent to be taken as needed. Will defer to his PCP.  I have provided with my cell number should he have any further questions and or concerns.    We did also review signs/symptoms of stroke. He understands to seek immediate medical attention should he experience any such signs/symptoms.  Of note his daughter Asha (nurse [facility] was present during visit.

## 2024-06-03 ENCOUNTER — OFFICE VISIT (OUTPATIENT)
Dept: FAMILY MEDICINE CLINIC | Facility: CLINIC | Age: 59
End: 2024-06-03
Payer: COMMERCIAL

## 2024-06-03 VITALS
WEIGHT: 210 LBS | DIASTOLIC BLOOD PRESSURE: 78 MMHG | HEIGHT: 71 IN | HEART RATE: 75 BPM | OXYGEN SATURATION: 99 % | BODY MASS INDEX: 29.4 KG/M2 | SYSTOLIC BLOOD PRESSURE: 138 MMHG

## 2024-06-03 DIAGNOSIS — Z11.4 SCREENING FOR HIV (HUMAN IMMUNODEFICIENCY VIRUS): ICD-10-CM

## 2024-06-03 DIAGNOSIS — R73.01 IFG (IMPAIRED FASTING GLUCOSE): ICD-10-CM

## 2024-06-03 DIAGNOSIS — K76.0 FATTY LIVER: ICD-10-CM

## 2024-06-03 DIAGNOSIS — I65.22 LEFT CAROTID ARTERY STENOSIS: ICD-10-CM

## 2024-06-03 DIAGNOSIS — E78.00 PURE HYPERCHOLESTEROLEMIA: ICD-10-CM

## 2024-06-03 DIAGNOSIS — K62.5 RECTAL BLEEDING: ICD-10-CM

## 2024-06-03 DIAGNOSIS — R53.82 CHRONIC FATIGUE: ICD-10-CM

## 2024-06-03 DIAGNOSIS — K64.9 HEMORRHOIDS, UNSPECIFIED HEMORRHOID TYPE: ICD-10-CM

## 2024-06-03 DIAGNOSIS — I10 ESSENTIAL HYPERTENSION: ICD-10-CM

## 2024-06-03 DIAGNOSIS — Z12.5 PROSTATE CANCER SCREENING: ICD-10-CM

## 2024-06-03 DIAGNOSIS — Z00.00 WELL ADULT ON ROUTINE HEALTH CHECK: Primary | ICD-10-CM

## 2024-06-03 DIAGNOSIS — M10.00 IDIOPATHIC GOUT, UNSPECIFIED CHRONICITY, UNSPECIFIED SITE: ICD-10-CM

## 2024-06-03 DIAGNOSIS — E55.9 VITAMIN D DEFICIENCY: ICD-10-CM

## 2024-06-03 PROCEDURE — 99396 PREV VISIT EST AGE 40-64: CPT | Performed by: FAMILY MEDICINE

## 2024-06-03 PROCEDURE — 99214 OFFICE O/P EST MOD 30 MIN: CPT | Performed by: FAMILY MEDICINE

## 2024-06-03 RX ORDER — IRBESARTAN 150 MG/1
150 TABLET ORAL DAILY
Qty: 100 TABLET | Refills: 3 | Status: SHIPPED | OUTPATIENT
Start: 2024-06-03

## 2024-06-03 RX ORDER — ATORVASTATIN CALCIUM 20 MG/1
20 TABLET, FILM COATED ORAL DAILY
Qty: 90 TABLET | Refills: 3 | Status: SHIPPED | OUTPATIENT
Start: 2024-06-03

## 2024-06-03 NOTE — ASSESSMENT & PLAN NOTE
He still has some fluctuating blood pressure levels and I am going to switch him over to irbesartan 150 mg daily instead of losartan.  He will be getting some blood work done as well.

## 2024-06-03 NOTE — PATIENT INSTRUCTIONS
1. Well adult on routine health check  2. Vitamin D deficiency  -     Vitamin D 25 hydroxy; Future  -     Comprehensive metabolic panel; Future  3. Left carotid artery stenosis  4. Essential hypertension  Assessment & Plan:  He still has some fluctuating blood pressure levels and I am going to switch him over to irbesartan 150 mg daily instead of losartan.  He will be getting some blood work done as well.  Orders:  -     CBC and differential; Future  -     Comprehensive metabolic panel; Future  -     irbesartan (AVAPRO) 150 mg tablet; Take 1 tablet (150 mg total) by mouth daily  5. Fatty liver  -     CBC and differential; Future  -     Comprehensive metabolic panel; Future  -     Hemoglobin A1C; Future  6. IFG (impaired fasting glucose)  -     Comprehensive metabolic panel; Future  -     Hemoglobin A1C; Future  7. Pure hypercholesterolemia  -     Comprehensive metabolic panel; Future  -     Lipid Panel with Direct LDL reflex; Future  -     atorvastatin (LIPITOR) 20 mg tablet; Take 1 tablet (20 mg total) by mouth daily  8. Idiopathic gout, unspecified chronicity, unspecified site  -     Uric acid; Future  9. Hemorrhoids, unspecified hemorrhoid type  -     Ambulatory Referral to Colorectal Surgery; Future  10. Screening for HIV (human immunodeficiency virus)  11. Rectal bleeding  -     CBC and differential; Future  -     Comprehensive metabolic panel; Future  -     Ambulatory Referral to Colorectal Surgery; Future  12. Prostate cancer screening  -     PSA, Total Screen; Future  13. Chronic fatigue  -     CBC and differential; Future  -     Comprehensive metabolic panel; Future  -     Ambulatory Referral to Sleep Medicine; Future  -     TSH, 3rd generation with Free T4 reflex; Future   Better healthcare is in your genes. Learn more about a community health research program at [email or web address]     Who is eligible to join?  You are eligible to participate if you are 18 years or older at the time of enrollment. You are not  eligible to participate if you are a recipient of a donor bone marrow or a stem cell transplant.     Is there a cost to participate?  There is no cost to participate and health insurance is not required.    What can I learn about in this study?  You can learn about inherited risks for:                                              Common cancers: hereditary breast and ovarian cancer, and                                colorectal cancer related to Toussaint syndrome                              Heart disease: hereditary high cholesterol (also known as                                familial hypercholesterolemia)                             * You also have the opportunity to learn about your ancestry and                                other traits like, caffeine sensitivity, sleep patterns and more    How can I sign up? Go to [email or web address] or scan the [employer] to sign up.

## 2024-06-03 NOTE — PROGRESS NOTES
Adult Annual Physical  Name: Cj Smith      : 1965      MRN: [id number]  Encounter Provider: Kamran James Jr, MD  Encounter Date: 6/3/2024   Encounter department: [facility]    Assessment & Plan   1. Well adult on routine health check  Comments:  He wants to hold off on vaccination including Shingrix at this time.  He is up-to-date on cancer screening.  Check PSA.  2. Vitamin D deficiency  -     Vitamin D 25 hydroxy; Future  -     Comprehensive metabolic panel; Future  3. Left carotid artery stenosis  Assessment & Plan:  He will be having a repeat carotid duplex this summer and following up with vascular surgery.  Continue statin therapy.  Continue to work on better blood pressure control.  4. Essential hypertension  Assessment & Plan:  He still has some fluctuating blood pressure levels and I am going to switch him over to irbesartan 150 mg daily instead of losartan.  He will be getting some blood work done as well.  Orders:  -     CBC and differential; Future  -     Comprehensive metabolic panel; Future  -     irbesartan (AVAPRO) 150 mg tablet; Take 1 tablet (150 mg total) by mouth daily  5. Fatty liver  Assessment & Plan:  He does not drink alcohol at this point.  Continue routine monitoring of his LFTs.  Continue to work on weight loss.  Orders:  -     CBC and differential; Future  -     Comprehensive metabolic panel; Future  -     Hemoglobin A1C; Future  6. IFG (impaired fasting glucose)  -     Comprehensive metabolic panel; Future  -     Hemoglobin A1C; Future  7. Pure hypercholesterolemia  -     Comprehensive metabolic panel; Future  -     Lipid Panel with Direct LDL reflex; Future  -     atorvastatin (LIPITOR) 20 mg tablet; Take 1 tablet (20 mg total) by mouth daily  8. Idiopathic gout, unspecified chronicity, unspecified site  -     Uric acid; Future  9. Hemorrhoids, unspecified hemorrhoid type  -     Ambulatory Referral to Colorectal Surgery; Future  10.  Screening for HIV (human immunodeficiency virus)  11. Rectal bleeding  Assessment & Plan:  I am going to have him see colorectal surgery for some intermittent rectal bleeding.  He is up-to-date on colon cancer screening but should probably consider another colonoscopy.  Orders:  -     CBC and differential; Future  -     Comprehensive metabolic panel; Future  -     Ambulatory Referral to Colorectal Surgery; Future  12. Prostate cancer screening  -     PSA, Total Screen; Future  13. Chronic fatigue  -     CBC and differential; Future  -     Comprehensive metabolic panel; Future  -     Ambulatory Referral to Sleep Medicine; Future  -     TSH, 3rd generation with Free T4 reflex; Future    Immunizations and preventive care screenings were discussed with patient today. Appropriate education was printed on patient's after visit summary.    Discussed risks and benefits of prostate cancer screening. We discussed the controversial history of PSA screening for prostate cancer in the [place] as well as the risk of over detection and over treatment of prostate cancer by way of PSA screening.  The patient understands that PSA blood testing is an imperfect way to screen for prostate cancer and that elevated PSA levels in the blood may also be caused by infection, inflammation, prostatic trauma or manipulation, urological procedures, or by benign prostatic enlargement.    The role of the digital rectal examination in prostate cancer screening was also discussed and I discussed with him that there is large interobserver variability in the findings of digital rectal examination.    Counseling:  Alcohol/drug use: discussed moderation in alcohol intake, the recommendations for healthy alcohol use, and avoidance of illicit drug use.  Dental Health: discussed importance of regular tooth brushing, flossing, and dental visits.  Exercise: the importance of regular exercise/physical activity was discussed. Recommend exercise 3-5 times  per week for at least 30 minutes.          History of Present Illness     Adult Annual Physical:  Patient presents for annual physical. Patient presents today for follow-up of chronic health issues as well as a follow-up for an annual physical.  He is up-to-date on cancer screening including colonoscopy.  He has had some intermittent rectal bleeding now even status post hemorrhoidectomy.  He notes this is sporadic but occurs at least once or twice a month.  Sometimes the ball can be filled with blood.  He denies excessive pain with bowel movements but has noted change in the structure of his stool.  He has history of hypertension denies any chest pain, shortness of breath or palpitations.  Blood pressure readings have been between the 120s and higher 140s.  He has been compliant with blood pressure medication.  .     Diet and Physical Activity:  - Diet/Nutrition: well balanced diet.  - Exercise: moderate cardiovascular exercise.    General Health:  - Sleep: sleeps well.  - Hearing: normal hearing right ear and normal hearing left ear.  - Vision: no vision problems.  - Dental: regular dental visits.     Health:  - History of STDs: no.   - Urinary symptoms: nocturia.     Advanced Care Planning:  - Has an advanced directive?: no    - Has a durable medical POA?: no    - ACP document given to patient?: no      Review of Systems   Constitutional:  Negative for appetite change, chills, fatigue, fever and unexpected weight change.   HENT:  Negative for trouble swallowing.    Eyes:  Negative for visual disturbance.   Respiratory:  Negative for cough, chest tightness, shortness of breath and wheezing.    Cardiovascular:  Negative for chest pain, palpitations and leg swelling.   Gastrointestinal:  Positive for blood in stool. Negative for abdominal distention, abdominal pain, constipation and diarrhea.   Endocrine: Negative for polyuria.   Genitourinary:  Negative for difficulty urinating and flank pain.   Musculoskeletal:   "Negative for arthralgias and myalgias.   Skin:  Negative for rash.   Neurological:  Negative for dizziness and light-headedness.   Hematological:  Negative for adenopathy. Does not bruise/bleed easily.   Psychiatric/Behavioral:  Negative for dysphoric mood and sleep disturbance. The patient is not nervous/anxious.          Objective     /78 (BP Location: Left arm, Patient Position: Sitting, Cuff Size: Large)   Pulse 75   Ht 5' 10.5\" (1.791 m)   Wt 95.3 kg (210 lb)   SpO2 99%   BMI 29.71 kg/m²     Physical Exam  Constitutional:       General: He is not in acute distress.     Appearance: Normal appearance. He is well-developed. He is not diaphoretic.   HENT:      Head: Normocephalic.      Right Ear: External ear normal.      Left Ear: External ear normal.      Nose: Nose normal.   Eyes:      General:         Right eye: No discharge.         Left eye: No discharge.      Conjunctiva/sclera: Conjunctivae normal.      Pupils: Pupils are equal, round, and reactive to light.   Neck:      Thyroid: No thyromegaly.      Trachea: No tracheal deviation.   Cardiovascular:      Rate and Rhythm: Normal rate and regular rhythm.      Heart sounds: Normal heart sounds. No murmur heard.     No friction rub.   Pulmonary:      Effort: Pulmonary effort is normal. No respiratory distress.      Breath sounds: Normal breath sounds. No wheezing.   Chest:      Chest wall: No tenderness.   Abdominal:      General: There is no distension.      Palpations: There is no mass.      Tenderness: There is no abdominal tenderness. There is no guarding or rebound.      Hernia: No hernia is present.   Musculoskeletal:         General: No swelling or deformity.      Cervical back: Normal range of motion.      Right lower leg: No edema.      Left lower leg: No edema.   Skin:     Findings: No erythema or rash.   Neurological:      General: No focal deficit present.      Mental Status: He is alert.      Cranial Nerves: No cranial nerve deficit.     "  Coordination: Coordination normal.   Psychiatric:         Thought Content: Thought content normal.       Administrative Statements

## 2024-06-04 DIAGNOSIS — R53.82 CHRONIC FATIGUE: Primary | ICD-10-CM

## 2024-06-04 NOTE — ASSESSMENT & PLAN NOTE
He does not drink alcohol at this point.  Continue routine monitoring of his LFTs.  Continue to work on weight loss.

## 2024-06-04 NOTE — ASSESSMENT & PLAN NOTE
He will be having a repeat carotid duplex this summer and following up with vascular surgery.  Continue statin therapy.  Continue to work on better blood pressure control.

## 2024-06-04 NOTE — ASSESSMENT & PLAN NOTE
I am going to have him see colorectal surgery for some intermittent rectal bleeding.  He is up-to-date on colon cancer screening but should probably consider another colonoscopy.

## 2024-07-04 DIAGNOSIS — I10 ESSENTIAL HYPERTENSION: ICD-10-CM

## 2024-07-05 RX ORDER — HYDROCHLOROTHIAZIDE 25 MG/1
25 TABLET ORAL DAILY
Qty: 30 TABLET | Refills: 0 | Status: SHIPPED | OUTPATIENT
Start: 2024-07-05

## 2024-07-17 ENCOUNTER — HOSPITAL ENCOUNTER (OUTPATIENT)
Dept: NON INVASIVE DIAGNOSTICS | Facility: CLINIC | Age: 59
Discharge: HOME | End: 2024-07-17
Payer: COMMERCIAL

## 2024-07-17 DIAGNOSIS — I65.22 ASYMPTOMATIC CAROTID ARTERY STENOSIS WITHOUT INFARCTION, LEFT: ICD-10-CM

## 2024-07-17 PROCEDURE — 93880 EXTRACRANIAL BILAT STUDY: CPT

## 2024-07-18 PROCEDURE — 93880 EXTRACRANIAL BILAT STUDY: CPT | Performed by: SURGERY

## 2024-08-01 ENCOUNTER — TELEPHONE (OUTPATIENT)
Dept: GASTROENTEROLOGY | Facility: MEDICAL CENTER | Age: 59
End: 2024-08-01

## 2024-08-01 ENCOUNTER — CONSULT (OUTPATIENT)
Dept: GASTROENTEROLOGY | Facility: MEDICAL CENTER | Age: 59
End: 2024-08-01
Payer: COMMERCIAL

## 2024-08-01 VITALS
HEART RATE: 72 BPM | WEIGHT: 210 LBS | TEMPERATURE: 97 F | DIASTOLIC BLOOD PRESSURE: 82 MMHG | OXYGEN SATURATION: 97 % | SYSTOLIC BLOOD PRESSURE: 160 MMHG | BODY MASS INDEX: 29.4 KG/M2 | HEIGHT: 71 IN

## 2024-08-01 DIAGNOSIS — I10 ESSENTIAL HYPERTENSION: ICD-10-CM

## 2024-08-01 DIAGNOSIS — K64.9 HEMORRHOIDS, UNSPECIFIED HEMORRHOID TYPE: ICD-10-CM

## 2024-08-01 DIAGNOSIS — K62.5 RECTAL BLEEDING: ICD-10-CM

## 2024-08-01 PROCEDURE — 99214 OFFICE O/P EST MOD 30 MIN: CPT | Performed by: PHYSICIAN ASSISTANT

## 2024-08-01 RX ORDER — SODIUM PICOSULFATE, MAGNESIUM OXIDE, AND ANHYDROUS CITRIC ACID 12; 3.5; 1 G/175ML; G/175ML; MG/175ML
LIQUID ORAL
Qty: 350 ML | Refills: 0 | Status: SHIPPED | OUTPATIENT
Start: 2024-08-01

## 2024-08-01 RX ORDER — SODIUM PICOSULFATE, MAGNESIUM OXIDE, AND ANHYDROUS CITRIC ACID 12; 3.5; 1 G/175ML; G/175ML; MG/175ML
LIQUID ORAL
Qty: 350 ML | Refills: 0 | Status: CANCELLED | OUTPATIENT
Start: 2024-08-01

## 2024-08-01 RX ORDER — SOD SULF/POT CHLORIDE/MAG SULF 1.479 G
TABLET ORAL
Qty: 24 TABLET | Refills: 0 | Status: CANCELLED | OUTPATIENT
Start: 2024-08-01

## 2024-08-01 RX ORDER — HYDROCHLOROTHIAZIDE 25 MG/1
25 TABLET ORAL DAILY
Qty: 30 TABLET | Refills: 2 | Status: SHIPPED | OUTPATIENT
Start: 2024-08-01

## 2024-08-01 NOTE — PROGRESS NOTES
[facility] - Outpatient Consultation  Cj Smith 58 y.o. male MRN: [id number]  Encounter: [id number]      Assessment and Plan    1.  Rectal bleeding  The patient has intermittent blood with wiping and occasionally blood in the toilet bowl but no hematochezia.  He denies any unintentional weight loss, changes to his bowel habits, or other GI symptoms or complaints.  His last colonoscopy was in 2016 and negative for polyps but revealed both internal and external hemorrhoids.  He had the internal hemorrhoids banded in 2020.  His PCP referred him for consideration of colonoscopy.  -Discussed with the patient that it sounds as though his bleeding is likely hemorrhoidal however a colonoscopy would be the definitive way to rule out anything more concerning such as malignancy, the patient is agreeable to proceeding but would like to wait until the fall, he is aware if he has any unintentional weight loss, worsening rectal bleeding, changes in his bowel habits, etc. he should notify us and we can schedule a sooner    The risks/benefits/alternatives of the procedure were discussed with the patient. Risks included, but not limited to, infection, bleeding, perforation, injury to organs in the abdomen, missed lesion and incomplete procedure were discussed. Patient was agreeable.    Follow-up as needed    ______________________________________________________________________    History of Present Illness  Cj Smith is a 58 y.o. male here for consultation of rectal bleeding.  The patient states that this is intermittent and is typically with wiping sometimes also in the toilet bowl.  Never in or on his stool.  He thinks it is secondary to hemorrhoids.  He denies any constipation or diarrhea.  No unintentional weight loss or family history of colorectal cancer.  The patient's last colonoscopy was in 2016 and negative for polyps but revealed both internal and external hemorrhoids.  In 2020 the  patient underwent rubber band ligation of his internal hemorrhoids.      Review of Systems   Constitutional:  Negative for activity change, appetite change, chills, fatigue, fever and unexpected weight change.   Gastrointestinal:  Negative for abdominal distention, abdominal pain, anal bleeding, blood in stool, constipation, diarrhea, nausea, rectal pain and vomiting.       Past Medical History  Past Medical History:   Diagnosis Date    Allergic reaction     Resolved 3/6/2017     Erectile dysfunction     Gross hematuria     Hypercholesteremia     Hypertension        Past Social history  Past Surgical History:   Procedure Laterality Date    APPENDECTOMY  2011    MT COLONOSCOPY FLX DX W/COLLJ SPEC WHEN PFRMD N/A 9/20/2016    Procedure: COLONOSCOPY;  Surgeon: Cesar Murray MD;  Location: [facility];  Service: Gastroenterology     Social History     Socioeconomic History    Marital status:      Spouse name: Not on file    Number of children: Not on file    Years of education: Not on file    Highest education level: Not on file   Occupational History    Occupation: Sales   Tobacco Use    Smoking status: Never    Smokeless tobacco: Never   Vaping Use    Vaping status: Never Used   Substance and Sexual Activity    Alcohol use: Yes    Drug use: No    Sexual activity: Not on file   Other Topics Concern    Not on file   Social History Narrative    Not on file     Social Determinants of Health     Financial Resource Strain: Not on file   Food Insecurity: Not on file   Transportation Needs: Not on file   Physical Activity: Not on file   Stress: Not on file   Social Connections: Not on file   Intimate Partner Violence: Not on file   Housing Stability: Not on file     Social History     Substance and Sexual Activity   Alcohol Use Yes     Social History     Substance and Sexual Activity   Drug Use No     Social History     Tobacco Use   Smoking Status Never   Smokeless Tobacco Never       Past Family History  Family  History   Problem Relation Age of Onset    Hyperlipidemia Mother     Colon cancer Neg Hx        Current Medications  Current Outpatient Medications   Medication Sig Dispense Refill    aspirin 81 mg chewable tablet Chew 1 tablet (81 mg total) daily      atorvastatin (LIPITOR) 20 mg tablet Take 1 tablet (20 mg total) by mouth daily 90 tablet 3    hydroCHLOROthiazide 25 mg tablet TAKE 1 TABLET (25 MG TOTAL) BY MOUTH DAILY. 30 tablet 0    irbesartan (AVAPRO) 150 mg tablet Take 1 tablet (150 mg total) by mouth daily 100 tablet 3    fluticasone (FLONASE) 50 mcg/act nasal spray 2 sprays into each nostril daily (Patient not taking: Reported on 4/12/2024) 48 g 3     No current facility-administered medications for this visit.       Allergies  No Known Allergies      The following portions of the patient's history were reviewed and updated as appropriate: allergies, current medications, past medical history, past social history, past surgical history and problem list.      Vitals  There were no vitals filed for this visit.      Physical Exam  Constitutional   General appearance: Patient is seated and in no acute distress, well appearing and well nourished.   Head and Face   Head and face: Normal.    Eyes   Conjunctiva and lids: No erythema, swelling or discharge.  Anicteric.  Ears, Nose, Mouth, and Throat   Hearing: Normal.    Neck: Supple, trachea midline.  Pulmonary   Respiratory effort: No increased work of breathing or signs of respiratory distress.    Cardiovascular   Examination of extremities for edema and/or varicosities: Normal.    Musculoskeletal   Gait and station: Normal    Skin   Skin and subcutaneous tissue: Warm, dry, and intact. No visible jaundice, lesions or rashes.  Psychiatric   Judgment and insight: Normal  Recent and remote memory:  Normal  Mood and affect: Normal      Results  No visits with results within 1 Day(s) from this visit.   Latest known visit with results is:   Appointment on 05/08/2023    Component Date Value    WBC 05/08/2023 5.82     RBC 05/08/2023 5.38     Hemoglobin 05/08/2023 15.7     Hematocrit 05/08/2023 48.1     MCV 05/08/2023 89     MCH 05/08/2023 29.2     MCHC 05/08/2023 32.6     RDW 05/08/2023 12.4     MPV 05/08/2023 10.7     Platelets 05/08/2023 216     nRBC 05/08/2023 0     Segmented % 05/08/2023 58     Immature Grans % 05/08/2023 0     Lymphocytes % 05/08/2023 29     Monocytes % 05/08/2023 10     Eosinophils Relative 05/08/2023 2     Basophils Relative 05/08/2023 1     Absolute Neutrophils 05/08/2023 3.36     Absolute Immature Grans 05/08/2023 0.01     Absolute Lymphocytes 05/08/2023 1.71     Absolute Monocytes 05/08/2023 0.58     Eosinophils Absolute 05/08/2023 0.12     Basophils Absolute 05/08/2023 0.04     Sodium 05/08/2023 138     Potassium 05/08/2023 4.1     Chloride 05/08/2023 105     CO2 05/08/2023 31     ANION GAP 05/08/2023 2 (L)     BUN 05/08/2023 17     Creatinine 05/08/2023 1.15     Glucose, Fasting 05/08/2023 103 (H)     Calcium 05/08/2023 10.0     AST 05/08/2023 25     ALT 05/08/2023 33     Alkaline Phosphatase 05/08/2023 66     Total Protein 05/08/2023 7.9     Albumin 05/08/2023 4.3     Total Bilirubin 05/08/2023 0.86     eGFR 05/08/2023 69     Cholesterol 05/08/2023 186     Triglycerides 05/08/2023 182 (H)     HDL, Direct 05/08/2023 44     LDL Calculated 05/08/2023 106 (H)     Uric Acid 05/08/2023 8.1     PSA 05/08/2023 0.7        Radiology Results  VAS carotid complete study    Result Date: 7/18/2024  Narrative:  [facility] REPORT CLINICAL: Indications: 6 month surveillance of carotid artery disease. Patient is asymptomatic at this time. Operative History: No Cardiovascular Surgeries Risk Factors The patient has history of HTN, non-smoker, and Hyperlipidemia. Clinical Right Pressure:  138/78 mm Hg, Left Pressure:  142/80 mm Hg.  FINDINGS:  Right        Impression  PSV  EDV (cm/s)  Ratio  Dist. ICA                 83          32   0.85  Mid. ICA                   73          29   0.74  Prox. ICA    1 - 49%      76          25   0.78  Dist CCA                  79          22         Mid CCA                   98          24   0.97  Prox CCA                 102          18         Ext Carotid               99          15   1.01  Prox Vert                 53          16         Subclavian               113           0          Left         Impression  PSV  EDV (cm/s)  Ratio  Dist. ICA                 68          30   0.75  Mid. ICA                 112          32   1.24  Prox. ICA    70 - 99%    360         143   4.00  Dist CCA                  76          21         Mid CCA                   90          19   0.96  Prox CCA                  94          20         Ext Carotid               97          12   1.07  Prox Vert                 48          15         Subclavian               150           0            CONCLUSION:  Impression RIGHT: There is <50% stenosis noted in the internal carotid artery. Plaque is heterogenous and smooth. Vertebral artery flow is antegrade. There is no significant subclavian artery disease.  LEFT: There is 70-99% stenosis noted in the carotid bulb/ internal carotid artery. Plaque is heterogenous and irregular. Vertebral artery flow is antegrade. There is no significant subclavian artery disease.  Compared to previous study on 3/25/2024, there is no significant change in the disease process.  SIGNATURE: Electronically Signed by: LUDY KIM DO, RPVI on 2024-07-18 06:23:44 PM      Orders  No orders of the defined types were placed in this encounter.

## 2024-08-12 ENCOUNTER — HOSPITAL ENCOUNTER (OUTPATIENT)
Dept: SLEEP CENTER | Facility: CLINIC | Age: 59
Discharge: HOME | End: 2024-08-12
Payer: COMMERCIAL

## 2024-08-12 DIAGNOSIS — R53.82 CHRONIC FATIGUE: ICD-10-CM

## 2024-08-12 PROCEDURE — G0399 HOME SLEEP TEST/TYPE 3 PORTA: HCPCS

## 2024-08-13 NOTE — PROGRESS NOTES
Home Sleep Study Documentation    HOME STUDY DEVICE: Noxturnal yes                                           Kinza G3 no      Pre-Sleep Home Study:    Set-up and instructions performed by: LINDSAY Viramontes, RST, CRT    Technician performed demonstration for Patient: yes    Return demonstration performed by Patient: yes    Written instructions provided to Patient: yes    Patient signed consent form: yes        Post-Sleep Home Study:    Additional comments by Patient: None    Home Sleep Study Failed:no:    Failure reason: N/A    Reported or Detected: N/A    Scored by: LINDSAY Doyle

## 2024-08-14 PROBLEM — G47.33 OSA (OBSTRUCTIVE SLEEP APNEA): Status: ACTIVE | Noted: 2024-08-14

## 2024-08-15 DIAGNOSIS — G47.33 OBSTRUCTIVE SLEEP APNEA SYNDROME: Primary | ICD-10-CM

## 2024-08-15 NOTE — ASSESSMENT & PLAN NOTE
The patient is noted to have moderate sleep apnea on home study with significant hypoxia.  I am referring him to sleep medicine.

## 2024-10-26 DIAGNOSIS — I10 ESSENTIAL HYPERTENSION: ICD-10-CM

## 2024-10-28 RX ORDER — HYDROCHLOROTHIAZIDE 25 MG/1
25 TABLET ORAL DAILY
Qty: 90 TABLET | Refills: 1 | Status: SHIPPED | OUTPATIENT
Start: 2024-10-28

## 2024-10-31 ENCOUNTER — ANESTHESIA EVENT (OUTPATIENT)
Dept: ANESTHESIOLOGY | Facility: HOSPITAL | Age: 59
End: 2024-10-31

## 2024-10-31 ENCOUNTER — ANESTHESIA (OUTPATIENT)
Dept: ANESTHESIOLOGY | Facility: HOSPITAL | Age: 59
End: 2024-10-31

## 2024-11-01 ENCOUNTER — PATIENT MESSAGE (OUTPATIENT)
Dept: GASTROENTEROLOGY | Facility: CLINIC | Age: 59
End: 2024-11-01

## 2024-11-04 ENCOUNTER — TELEPHONE (OUTPATIENT)
Age: 59
End: 2024-11-04

## 2024-11-04 NOTE — TELEPHONE ENCOUNTER
Pt asked to reschedule during confirmation call:     Scheduled date of colonoscopy (as of today): 12/16/2024  Physician performing colonoscopy: Dr. Tucker   Location of colonoscopy: [facility]  Bowel prep reviewed with patient: Clenpiq   Instructions reviewed with patient by: Marixa (pt has)  Clearances: N/A

## 2024-12-02 ENCOUNTER — ANESTHESIA (OUTPATIENT)
Dept: ANESTHESIOLOGY | Facility: HOSPITAL | Age: 59
End: 2024-12-02

## 2024-12-02 ENCOUNTER — ANESTHESIA EVENT (OUTPATIENT)
Dept: ANESTHESIOLOGY | Facility: HOSPITAL | Age: 59
End: 2024-12-02

## 2024-12-16 ENCOUNTER — ANESTHESIA (OUTPATIENT)
Dept: GASTROENTEROLOGY | Facility: MEDICAL CENTER | Age: 59
End: 2024-12-16
Payer: COMMERCIAL

## 2024-12-16 ENCOUNTER — HOSPITAL ENCOUNTER (OUTPATIENT)
Dept: GASTROENTEROLOGY | Facility: MEDICAL CENTER | Age: 59
Setting detail: OUTPATIENT SURGERY
Discharge: HOME | End: 2024-12-16
Payer: COMMERCIAL

## 2024-12-16 ENCOUNTER — ANESTHESIA EVENT (OUTPATIENT)
Dept: GASTROENTEROLOGY | Facility: MEDICAL CENTER | Age: 59
End: 2024-12-16
Payer: COMMERCIAL

## 2024-12-16 VITALS
RESPIRATION RATE: 16 BRPM | SYSTOLIC BLOOD PRESSURE: 134 MMHG | DIASTOLIC BLOOD PRESSURE: 84 MMHG | OXYGEN SATURATION: 99 % | TEMPERATURE: 97.1 F | HEART RATE: 81 BPM

## 2024-12-16 DIAGNOSIS — K62.5 RECTAL BLEEDING: ICD-10-CM

## 2024-12-16 PROCEDURE — 88305 TISSUE EXAM BY PATHOLOGIST: CPT | Performed by: PATHOLOGY

## 2024-12-16 PROCEDURE — 45380 COLONOSCOPY AND BIOPSY: CPT | Performed by: INTERNAL MEDICINE

## 2024-12-16 RX ORDER — PROPOFOL 10 MG/ML
INJECTION, EMULSION INTRAVENOUS AS NEEDED
Status: DISCONTINUED | OUTPATIENT
Start: 2024-12-16 | End: 2024-12-16

## 2024-12-16 RX ORDER — SODIUM CHLORIDE 9 MG/ML
125 INJECTION, SOLUTION INTRAVENOUS CONTINUOUS
Status: CANCELLED | OUTPATIENT
Start: 2024-12-16

## 2024-12-16 RX ORDER — SODIUM CHLORIDE 9 MG/ML
125 INJECTION, SOLUTION INTRAVENOUS CONTINUOUS
Status: DISCONTINUED | OUTPATIENT
Start: 2024-12-16 | End: 2024-12-18

## 2024-12-16 RX ADMIN — PROPOFOL 50 MG: 10 INJECTION, EMULSION INTRAVENOUS at 11:02

## 2024-12-16 RX ADMIN — PROPOFOL 50 MG: 10 INJECTION, EMULSION INTRAVENOUS at 11:07

## 2024-12-16 RX ADMIN — PROPOFOL 50 MG: 10 INJECTION, EMULSION INTRAVENOUS at 11:05

## 2024-12-16 RX ADMIN — SODIUM CHLORIDE: 0.9 INJECTION, SOLUTION INTRAVENOUS at 11:12

## 2024-12-16 RX ADMIN — SODIUM CHLORIDE 125 ML/HR: 0.9 INJECTION, SOLUTION INTRAVENOUS at 10:49

## 2024-12-16 RX ADMIN — Medication 40 MG: at 11:02

## 2024-12-16 RX ADMIN — PROPOFOL 50 MG: 10 INJECTION, EMULSION INTRAVENOUS at 11:00

## 2024-12-16 RX ADMIN — PROPOFOL 300 MG: 10 INJECTION, EMULSION INTRAVENOUS at 10:59

## 2024-12-16 NOTE — ANESTHESIA PREPROCEDURE EVALUATION
Procedure:  COLONOSCOPY     - denies any chest pain, palpitations, shortness of breath, syncope, lightheadedness, seizures   - denies any recent infectious symptoms such as fevers, chills, cough   - denies taking any anticoagulation medications or any issues with bleeding, bruising, clotting    EKG (05/08/23):  normal sinus rhythm    VAS CAROTID (07/17/24):  RIGHT:   - There is <50% stenosis noted in the internal carotid artery.   - Plaque is heterogenous and smooth.   - Vertebral artery flow is antegrade. There is no significant subclavian arterydisease.    LEFT:   - There is 70-99% stenosis noted in the carotid bulb/ internal carotid artery.   - Plaque is heterogenous and irregular.   - Vertebral artery flow is antegrade. There is no significant subclavian arterydisease.     Compared to previous study on 3/25/2024, there is no significant change in the  disease process.    Relevant Problems   ANESTHESIA (within normal limits)      CARDIO   (+) Essential hypertension   (+) Hemorrhoids   (+) Left carotid artery stenosis   (+) Pure hypercholesterolemia      ENDO (within normal limits)      GI/HEPATIC   (+) Fatty liver      /RENAL   (+) BPH with obstruction/lower urinary tract symptoms      GYN (within normal limits)      HEMATOLOGY (within normal limits)      MUSCULOSKELETAL   (+) Gout      NEURO/PSYCH (within normal limits)      PULMONARY   (+) Obstructive sleep apnea syndrome      Lab Results   Component Value Date    WBC 5.82 05/08/2023    HGB 15.7 05/08/2023    HCT 48.1 05/08/2023    MCV 89 05/08/2023     05/08/2023     Lab Results   Component Value Date    SODIUM 138 05/08/2023    K 4.1 05/08/2023     05/08/2023    CO2 31 05/08/2023    AGAP 2 (L) 05/08/2023    BUN 17 05/08/2023    CREATININE 1.15 05/08/2023    GLUC 102 09/07/2016    GLUF 103 (H) 05/08/2023    CALCIUM 10.0 05/08/2023    AST 25 05/08/2023    ALT 33 05/08/2023    ALKPHOS 66 05/08/2023    TP 7.9 05/08/2023    TBILI 0.86 05/08/2023     "EGFR 69 05/08/2023     No results found for: \"PTT\"  No results found for: \"INR\", \"PROTIME\"    Physical Exam    Airway    Mallampati score: III  TM Distance: >3 FB  Neck ROM: full     Dental       Cardiovascular  Rhythm: regular, Rate: normal, Cardiovascular exam normal    Pulmonary  Pulmonary exam normal Breath sounds clear to auscultation    Other Findings        Anesthesia Plan  ASA Score- 3     Anesthesia Type- IV sedation with anesthesia with ASA Monitors.         Additional Monitors:     Airway Plan:            Plan Factors-Exercise tolerance (METS): >4 METS.    Chart reviewed. EKG reviewed. Imaging results reviewed. Existing labs reviewed. Patient summary reviewed.    Patient is not a current smoker.  Patient did not smoke on day of surgery.    Obstructive sleep apnea risk education given perioperatively.        Induction- intravenous.    Postoperative Plan-         Informed Consent- Anesthetic plan and risks discussed with patient.  I personally reviewed this patient with the CRNA. Discussed and agreed on the Anesthesia Plan with the CRNA..        "

## 2024-12-16 NOTE — ANESTHESIA POSTPROCEDURE EVALUATION
Post-Op Assessment Note    CV Status:  Stable    Pain management: adequate       Mental Status:  Alert     Post Op Vitals Reviewed: Yes    No anethesia notable event occurred.    Staff: CRNA           Last Filed PACU Vitals:  Vitals Value Taken Time   Temp     Pulse     BP     Resp     SpO2         Modified Cristian:  Activity: 2 (12/16/2024 10:29 AM)  Respiration: 2 (12/16/2024 10:29 AM)  Circulation: 2 (12/16/2024 10:29 AM)  Consciousness: 2 (12/16/2024 10:29 AM)  Oxygen Saturation: 2 (12/16/2024 10:29 AM)  Modified Cristian Score: 10 (12/16/2024 10:29 AM)

## 2024-12-16 NOTE — H&P
History and Physical - [facility]  Cj Smith 59 y.o. adult MRN: [id number]                  HPI: Cj Smith is a 59 y.o. year old adult who presents for rectal bleeding.      REVIEW OF SYSTEMS: Per the HPI, and otherwise unremarkable.    Historical Information   Past Medical History:   Diagnosis Date    Allergic reaction     Resolved 3/6/2017     Erectile dysfunction     Gross hematuria     Hypercholesteremia     Hypertension      Past Surgical History:   Procedure Laterality Date    APPENDECTOMY  2011    HI COLONOSCOPY FLX DX W/COLLJ SPEC WHEN PFRMD N/A 9/20/2016    Procedure: COLONOSCOPY;  Surgeon: Cesar Murray MD;  Location: [facility];  Service: Gastroenterology     Social History   Social History     Substance and Sexual Activity   Alcohol Use Yes     Social History     Substance and Sexual Activity   Drug Use No     Social History     Tobacco Use   Smoking Status Never   Smokeless Tobacco Never     Family History   Problem Relation Age of Onset    Hyperlipidemia Mother     Colon cancer Neg Hx        Meds/Allergies       Current Outpatient Medications:     aspirin 81 mg chewable tablet    atorvastatin (LIPITOR) 20 mg tablet    hydroCHLOROthiazide 25 mg tablet    irbesartan (AVAPRO) 150 mg tablet    sodium picosulfate, magnesium oxide, citric acid (Clenpiq) oral solution    fluticasone (FLONASE) 50 mcg/act nasal spray    No Known Allergies    Objective     BP (!) 182/104   Pulse 69   Temp (!) 97.1 °F (36.2 °C)   Resp 18   SpO2 96%       PHYSICAL EXAM    Gen: NAD  Head: NCAT  CV: RRR  CHEST: Clear  ABD: soft, NT/ND  EXT: no edema      ASSESSMENT/PLAN:  This is a 59 y.o. year old adult here for colonoscopy, and he is stable and optimized for his procedure.

## 2024-12-18 ENCOUNTER — RESULTS FOLLOW-UP (OUTPATIENT)
Dept: GASTROENTEROLOGY | Facility: CLINIC | Age: 59
End: 2024-12-18

## 2024-12-18 ENCOUNTER — OFFICE VISIT (OUTPATIENT)
Dept: FAMILY MEDICINE CLINIC | Facility: CLINIC | Age: 59
End: 2024-12-18
Payer: COMMERCIAL

## 2024-12-18 VITALS
OXYGEN SATURATION: 99 % | SYSTOLIC BLOOD PRESSURE: 130 MMHG | DIASTOLIC BLOOD PRESSURE: 80 MMHG | BODY MASS INDEX: 28.86 KG/M2 | HEART RATE: 70 BPM | WEIGHT: 204 LBS

## 2024-12-18 DIAGNOSIS — E78.00 PURE HYPERCHOLESTEROLEMIA: ICD-10-CM

## 2024-12-18 DIAGNOSIS — G47.33 OBSTRUCTIVE SLEEP APNEA SYNDROME: ICD-10-CM

## 2024-12-18 DIAGNOSIS — I10 ESSENTIAL HYPERTENSION: Primary | ICD-10-CM

## 2024-12-18 DIAGNOSIS — M10.00 IDIOPATHIC GOUT, UNSPECIFIED CHRONICITY, UNSPECIFIED SITE: ICD-10-CM

## 2024-12-18 DIAGNOSIS — N13.8 BPH WITH OBSTRUCTION/LOWER URINARY TRACT SYMPTOMS: ICD-10-CM

## 2024-12-18 DIAGNOSIS — R09.81 NASAL CONGESTION: ICD-10-CM

## 2024-12-18 DIAGNOSIS — M75.40 IMPINGEMENT SYNDROME OF SHOULDER REGION, UNSPECIFIED LATERALITY: ICD-10-CM

## 2024-12-18 DIAGNOSIS — I65.22 LEFT CAROTID ARTERY STENOSIS: ICD-10-CM

## 2024-12-18 DIAGNOSIS — N40.1 BPH WITH OBSTRUCTION/LOWER URINARY TRACT SYMPTOMS: ICD-10-CM

## 2024-12-18 DIAGNOSIS — K76.0 FATTY LIVER: ICD-10-CM

## 2024-12-18 DIAGNOSIS — N52.9 ERECTILE DYSFUNCTION, UNSPECIFIED ERECTILE DYSFUNCTION TYPE: ICD-10-CM

## 2024-12-18 DIAGNOSIS — R73.01 IFG (IMPAIRED FASTING GLUCOSE): ICD-10-CM

## 2024-12-18 PROBLEM — K62.5 RECTAL BLEEDING: Status: RESOLVED | Noted: 2020-09-11 | Resolved: 2024-12-18

## 2024-12-18 PROCEDURE — 99214 OFFICE O/P EST MOD 30 MIN: CPT | Performed by: FAMILY MEDICINE

## 2024-12-18 PROCEDURE — 88305 TISSUE EXAM BY PATHOLOGIST: CPT | Performed by: PATHOLOGY

## 2024-12-18 RX ORDER — TADALAFIL 20 MG/1
20 TABLET ORAL DAILY PRN
Qty: 10 TABLET | Refills: 5 | Status: SHIPPED | OUTPATIENT
Start: 2024-12-18

## 2024-12-18 RX ORDER — FLUTICASONE PROPIONATE 50 MCG
2 SPRAY, SUSPENSION (ML) NASAL DAILY PRN
Qty: 48 G | Refills: 3 | Status: SHIPPED | OUTPATIENT
Start: 2024-12-18

## 2024-12-18 NOTE — ASSESSMENT & PLAN NOTE
Repeat carotid duplex.  I am also going to have him follow-up with vascular surgery.  Orders:    VAS carotid complete study; Future

## 2024-12-18 NOTE — ASSESSMENT & PLAN NOTE
Flonase was refilled.  Orders:    fluticasone (FLONASE) 50 mcg/act nasal spray; 2 sprays into each nostril daily as needed for rhinitis

## 2024-12-18 NOTE — ASSESSMENT & PLAN NOTE
Well-controlled at this time.  Check labs as previously ordered please he is overdue for labs.

## 2024-12-18 NOTE — ASSESSMENT & PLAN NOTE
Refill Cialis for as needed use.  Orders:    tadalafil (CIALIS) 20 MG tablet; Take 1 tablet (20 mg total) by mouth daily as needed for erectile dysfunction

## 2024-12-18 NOTE — PROGRESS NOTES
Name: Cj Smith      : 1965      MRN: [id number]  Encounter Provider: Kamran James Jr, MD  Encounter Date: 2024   Encounter department: [facility]  :  Assessment & Plan  Essential hypertension  Well-controlled at this time.  Check labs as previously ordered please he is overdue for labs.       Pure hypercholesterolemia  Check fasting lipid panel.       Left carotid artery stenosis  Repeat carotid duplex.  I am also going to have him follow-up with vascular surgery.  Orders:    VAS carotid complete study; Future    Nasal congestion  Flonase was refilled.  Orders:    fluticasone (FLONASE) 50 mcg/act nasal spray; 2 sprays into each nostril daily as needed for rhinitis    Fatty liver  Continue routine exercise and moderation of alcohol.       IFG (impaired fasting glucose)  Monitor glucose       Obstructive sleep apnea syndrome  He is going to see sleep medicine in January       BPH with obstruction/lower urinary tract symptoms  Minimal symptoms at this time.       Idiopathic gout, unspecified chronicity, unspecified site  Check uric acid with labs that have already been ordered       Erectile dysfunction, unspecified erectile dysfunction type  Refill Cialis for as needed use.  Orders:    tadalafil (CIALIS) 20 MG tablet; Take 1 tablet (20 mg total) by mouth daily as needed for erectile dysfunction    Impingement syndrome of shoulder region, unspecified laterality  He continues with some chronic discomfort but is managing at this time and does not want further evaluation.                History of Present Illness     HPI patient presents here for follow-up chronic health issues.  He is history hypertension and denies chest pain, shortness of breath or palpitations.  Home blood pressure readings have been well-controlled.  He is overdue for lab work.  He has some mild ED on occasion and would like to try a new medicine for it.  Review of Systems   Constitutional:   Negative for appetite change, chills, fatigue, fever and unexpected weight change.   HENT:  Negative for trouble swallowing.    Eyes:  Negative for visual disturbance.   Respiratory:  Negative for cough, chest tightness, shortness of breath and wheezing.    Cardiovascular:  Negative for chest pain, palpitations and leg swelling.   Gastrointestinal:  Negative for abdominal distention, abdominal pain, blood in stool, constipation and diarrhea.   Endocrine: Negative for polyuria.   Genitourinary:  Negative for difficulty urinating and flank pain.   Musculoskeletal:  Negative for arthralgias and myalgias.   Skin:  Negative for rash.   Neurological:  Negative for dizziness, light-headedness and headaches.   Hematological:  Negative for adenopathy. Does not bruise/bleed easily.   Psychiatric/Behavioral:  Negative for dysphoric mood and sleep disturbance. The patient is not nervous/anxious.        Objective   /80   Pulse 70   Wt 92.5 kg (204 lb)   SpO2 99%   BMI 28.86 kg/m²      Physical Exam  Constitutional:       General: He is not in acute distress.     Appearance: Normal appearance. He is well-developed. He is not diaphoretic.   HENT:      Head: Normocephalic.      Right Ear: External ear normal.      Left Ear: External ear normal.      Nose: Nose normal.   Eyes:      General:         Right eye: No discharge.         Left eye: No discharge.      Conjunctiva/sclera: Conjunctivae normal.      Pupils: Pupils are equal, round, and reactive to light.   Neck:      Thyroid: No thyromegaly.      Trachea: No tracheal deviation.   Cardiovascular:      Rate and Rhythm: Normal rate and regular rhythm.      Heart sounds: Normal heart sounds. No murmur heard.     No friction rub.   Pulmonary:      Effort: Pulmonary effort is normal. No respiratory distress.      Breath sounds: Normal breath sounds. No wheezing.   Chest:      Chest wall: No tenderness.   Abdominal:      General: There is no distension.      Palpations:  There is no mass.      Tenderness: There is no abdominal tenderness. There is no guarding or rebound.      Hernia: No hernia is present.   Musculoskeletal:         General: No swelling or deformity.      Cervical back: Normal range of motion.      Right lower leg: No edema.      Left lower leg: No edema.   Skin:     Findings: No erythema or rash.   Neurological:      General: No focal deficit present.      Mental Status: He is alert.      Cranial Nerves: No cranial nerve deficit.      Coordination: Coordination normal.   Psychiatric:         Thought Content: Thought content normal.

## 2024-12-20 NOTE — ASSESSMENT & PLAN NOTE
He continues with some chronic discomfort but is managing at this time and does not want further evaluation.

## 2025-02-06 ENCOUNTER — TELEPHONE (OUTPATIENT)
Dept: VASCULAR SURGERY | Facility: CLINIC | Age: 60
End: 2025-02-06

## 2025-03-04 DIAGNOSIS — N52.9 ERECTILE DYSFUNCTION, UNSPECIFIED ERECTILE DYSFUNCTION TYPE: ICD-10-CM

## 2025-03-04 RX ORDER — TADALAFIL 20 MG/1
20 TABLET ORAL DAILY PRN
Qty: 10 TABLET | Refills: 5 | Status: SHIPPED | OUTPATIENT
Start: 2025-03-04

## 2025-03-04 NOTE — PROGRESS NOTES
Pt requested cialis refill    Problem List Items Addressed This Visit          Other    ED (erectile dysfunction)    Relevant Medications    tadalafil (CIALIS) 20 MG tablet

## 2025-03-05 ENCOUNTER — HOSPITAL ENCOUNTER (OUTPATIENT)
Dept: NON INVASIVE DIAGNOSTICS | Facility: HOSPITAL | Age: 60
Discharge: HOME | End: 2025-03-05
Payer: COMMERCIAL

## 2025-03-05 DIAGNOSIS — I65.22 LEFT CAROTID ARTERY STENOSIS: ICD-10-CM

## 2025-03-05 PROCEDURE — 93880 EXTRACRANIAL BILAT STUDY: CPT | Performed by: SURGERY

## 2025-03-05 PROCEDURE — 93880 EXTRACRANIAL BILAT STUDY: CPT

## 2025-03-06 ENCOUNTER — RESULTS FOLLOW-UP (OUTPATIENT)
Dept: FAMILY MEDICINE CLINIC | Facility: CLINIC | Age: 60
End: 2025-03-06

## 2025-03-25 ENCOUNTER — TELEPHONE (OUTPATIENT)
Dept: VASCULAR SURGERY | Facility: CLINIC | Age: 60
End: 2025-03-25

## 2025-03-25 ENCOUNTER — OFFICE VISIT (OUTPATIENT)
Dept: VASCULAR SURGERY | Facility: CLINIC | Age: 60
End: 2025-03-25
Payer: COMMERCIAL

## 2025-03-25 VITALS
BODY MASS INDEX: 28.77 KG/M2 | WEIGHT: 201 LBS | SYSTOLIC BLOOD PRESSURE: 156 MMHG | DIASTOLIC BLOOD PRESSURE: 95 MMHG | HEIGHT: 70 IN | HEART RATE: 53 BPM | OXYGEN SATURATION: 97 %

## 2025-03-25 DIAGNOSIS — I65.22 LEFT CAROTID ARTERY STENOSIS: ICD-10-CM

## 2025-03-25 DIAGNOSIS — I65.22 ASYMPTOMATIC STENOSIS OF LEFT CAROTID ARTERY: Primary | ICD-10-CM

## 2025-03-25 PROCEDURE — 99214 OFFICE O/P EST MOD 30 MIN: CPT | Performed by: SURGERY

## 2025-03-25 NOTE — TELEPHONE ENCOUNTER
Spoke to PT about setting up his CV, we set it up for Monday 9/29/25 @ 8am. He likes Monday and around 8am for his appointments.

## 2025-03-25 NOTE — PROGRESS NOTES
Name: Cj Smith      : 1965      MRN: [id number]  Encounter Provider: Kiran Washington DO  Encounter Date: 3/25/2025   Encounter department: [facility]  :  Assessment & Plan  Asymptomatic stenosis of left carotid artery    Orders:    VAS carotid complete study; Future    Left carotid artery stenosis  Cj presents to the office to review surveillance carotid duplex.  He denies any focal neurologic deficits.  His carotid duplex does not demonstrate any hemodynamically significant changes.  His most recent duplex demonstrates velocities of 366/143 (prior 360/143).  Of note following his prior duplex a CTA was obtained which actually demonstrated a stenosis more in line with approximately 60-70%.  There was no significant calcific plaque noted.  I suspect his elevated velocities may be at least partially contributed to vessel tortuosity.  At this time recommend continued medical therapy and surveillance.  Patient has no restrictions from a carotid standpoint.                 History of Present Illness   HPI  Cj Smith is a 59 y.o. male who presents to the office to review surveillance carotid duplex.    Pt here for RR of 3/5/25. Pt is asym.   History obtained from: patient    Review of Systems  Past Medical History   Past Medical History:   Diagnosis Date    Allergic reaction     Resolved 3/6/2017     Erectile dysfunction     Gross hematuria     Hypercholesteremia     Hypertension      Past Surgical History:   Procedure Laterality Date    APPENDECTOMY      TX COLONOSCOPY FLX DX W/COLLJ SPEC WHEN PFRMD N/A 2016    Procedure: COLONOSCOPY;  Surgeon: Cesar Murray MD;  Location: [facility];  Service: Gastroenterology     Family History   Problem Relation Age of Onset    Hyperlipidemia Mother     Colon cancer Neg Hx       reports that he has never smoked. He has never used smokeless tobacco. He reports current alcohol use. He reports that he does not use  "drugs.  Current Outpatient Medications   Medication Instructions    aspirin 81 mg, Oral, Daily    atorvastatin (LIPITOR) 20 mg, Oral, Daily    fluticasone (FLONASE) 50 mcg/act nasal spray 2 sprays, Nasal, Daily PRN    hydroCHLOROthiazide 25 mg, Oral, Daily    irbesartan (AVAPRO) 150 mg, Oral, Daily    tadalafil (CIALIS) 20 mg, Oral, Daily PRN   No Known Allergies   Current Outpatient Medications on File Prior to Visit   Medication Sig Dispense Refill    aspirin 81 mg chewable tablet Chew 1 tablet (81 mg total) daily      atorvastatin (LIPITOR) 20 mg tablet Take 1 tablet (20 mg total) by mouth daily 90 tablet 3    fluticasone (FLONASE) 50 mcg/act nasal spray 2 sprays into each nostril daily as needed for rhinitis 48 g 3    hydroCHLOROthiazide 25 mg tablet TAKE 1 TABLET (25 MG TOTAL) BY MOUTH DAILY. 90 tablet 1    irbesartan (AVAPRO) 150 mg tablet Take 1 tablet (150 mg total) by mouth daily 100 tablet 3    tadalafil (CIALIS) 20 MG tablet Take 1 tablet (20 mg total) by mouth daily as needed for erectile dysfunction 10 tablet 5     No current facility-administered medications on file prior to visit.      Social History     Tobacco Use    Smoking status: Never    Smokeless tobacco: Never   Vaping Use    Vaping status: Never Used   Substance and Sexual Activity    Alcohol use: Yes    Drug use: No    Sexual activity: Not on file        Objective   /95 (BP Location: Left arm, Patient Position: Sitting)   Pulse (!) 53   Ht 5' 10\" (1.778 m)   Wt 91.2 kg (201 lb)   SpO2 97%   BMI 28.84 kg/m²      Physical Exam  Constitutional:       General: He is not in acute distress.     Appearance: He is well-developed. He is not ill-appearing, toxic-appearing or diaphoretic.   HENT:      Head: Normocephalic and atraumatic.      Right Ear: External ear normal.      Left Ear: External ear normal.   Eyes:      General: No scleral icterus.     Conjunctiva/sclera: Conjunctivae normal.   Neck:      Vascular: No carotid bruit.      " Trachea: No tracheal deviation.   Cardiovascular:      Rate and Rhythm: Normal rate.   Pulmonary:      Effort: Pulmonary effort is normal.   Abdominal:      General: There is no distension.      Palpations: Abdomen is soft. Mass: no appreciable aortic pulsation/aneurysm.   Musculoskeletal:         General: Normal range of motion.      Cervical back: Normal range of motion.      Right lower leg: No edema.      Left lower leg: No edema.   Skin:     General: Skin is warm and dry.   Neurological:      Mental Status: He is alert and oriented to person, place, and time.   Psychiatric:         Mood and Affect: Mood normal.         Behavior: Behavior normal.         Thought Content: Thought content normal.         Judgment: Judgment normal.     Carotid duplex:  CONCLUSION:     Impression  RIGHT:  There is <50% stenosis noted in the internal carotid artery.  Plaque is heterogenous and smooth.  Vertebral artery flow is antegrade. There is no significant subclavian artery  disease.     LEFT:  There is 70-99% stenosis noted in the carotid bulb/ internal carotid artery.  Plaque is heterogenous and irregular.  Vertebral artery flow is antegrade. There is no significant subclavian artery  disease.     Compared to previous study on 7/17/2024, there is no significant change in the  disease process.       Administrative Statements   I have spent a total time of 30 minutes in caring for this patient on the day of the visit/encounter including Diagnostic results, Prognosis, Risks and benefits of tx options, Instructions for management, Patient and family education, Importance of tx compliance, Risk factor reductions, Impressions, Counseling / Coordination of care, Documenting in the medical record, Reviewing/placing orders in the medical record (including tests, medications, and/or procedures), and Obtaining or reviewing history  .

## 2025-03-25 NOTE — ASSESSMENT & PLAN NOTE
Cj presents to the office to review surveillance carotid duplex.  He denies any focal neurologic deficits.  His carotid duplex does not demonstrate any hemodynamically significant changes.  His most recent duplex demonstrates velocities of 366/143 (prior 360/143).  Of note following his prior duplex a CTA was obtained which actually demonstrated a stenosis more in line with approximately 60-70%.  There was no significant calcific plaque noted.  I suspect his elevated velocities may be at least partially contributed to vessel tortuosity.  At this time recommend continued medical therapy and surveillance.  Patient has no restrictions from a carotid standpoint.

## 2025-05-26 DIAGNOSIS — I10 ESSENTIAL HYPERTENSION: ICD-10-CM

## 2025-05-27 RX ORDER — HYDROCHLOROTHIAZIDE 25 MG/1
25 TABLET ORAL DAILY
Qty: 30 TABLET | Refills: 1 | Status: SHIPPED | OUTPATIENT
Start: 2025-05-27

## 2025-05-28 NOTE — TELEPHONE ENCOUNTER
Called pt advise him refill was sent and he needs to get his labs done ASAP the medication needs to be monitored with labs.

## 2025-07-03 ENCOUNTER — APPOINTMENT (OUTPATIENT)
Dept: LAB | Facility: MEDICAL CENTER | Age: 60
End: 2025-07-03
Payer: COMMERCIAL

## 2025-07-03 DIAGNOSIS — Z12.5 SCREENING FOR PROSTATE CANCER: ICD-10-CM

## 2025-07-03 DIAGNOSIS — Z13.89 SCREENING FOR BLOOD OR PROTEIN IN URINE: ICD-10-CM

## 2025-07-03 DIAGNOSIS — R53.82 CHRONIC FATIGUE: ICD-10-CM

## 2025-07-03 DIAGNOSIS — I10 ESSENTIAL HYPERTENSION: Primary | ICD-10-CM

## 2025-07-03 DIAGNOSIS — E55.9 VITAMIN D DEFICIENCY: ICD-10-CM

## 2025-07-03 DIAGNOSIS — Z13.1 SCREENING FOR DIABETES MELLITUS: ICD-10-CM

## 2025-07-03 DIAGNOSIS — Z13.1 SCREENING FOR DIABETES MELLITUS: Primary | ICD-10-CM

## 2025-07-03 DIAGNOSIS — E78.2 MIXED HYPERLIPIDEMIA: ICD-10-CM

## 2025-07-03 LAB
25(OH)D3 SERPL-MCNC: 80.5 NG/ML (ref 30–100)
ALBUMIN SERPL BCG-MCNC: 4.9 G/DL (ref 3.5–5)
ALP SERPL-CCNC: 75 U/L (ref 34–104)
ALT SERPL W P-5'-P-CCNC: 31 U/L (ref 7–52)
ANION GAP SERPL CALCULATED.3IONS-SCNC: 9 MMOL/L (ref 4–13)
AST SERPL W P-5'-P-CCNC: 28 U/L (ref 13–39)
BASOPHILS # BLD AUTO: 0.03 THOUSANDS/ÂΜL (ref 0–0.1)
BASOPHILS NFR BLD AUTO: 1 % (ref 0–1)
BILIRUB SERPL-MCNC: 0.72 MG/DL (ref 0.2–1)
BUN SERPL-MCNC: 22 MG/DL (ref 5–25)
CALCIUM SERPL-MCNC: 10.5 MG/DL (ref 8.4–10.2)
CHLORIDE SERPL-SCNC: 100 MMOL/L (ref 96–108)
CHOLEST SERPL-MCNC: 201 MG/DL (ref ?–200)
CO2 SERPL-SCNC: 30 MMOL/L (ref 21–32)
CREAT SERPL-MCNC: 1.14 MG/DL (ref 0.6–1.3)
EOSINOPHIL # BLD AUTO: 0.16 THOUSAND/ÂΜL (ref 0–0.61)
EOSINOPHIL NFR BLD AUTO: 3 % (ref 0–6)
ERYTHROCYTE [DISTWIDTH] IN BLOOD BY AUTOMATED COUNT: 12.1 % (ref 11.6–15.1)
GFR SERPL CREATININE-BSD FRML MDRD: 69 ML/MIN/1.73SQ M
GLUCOSE P FAST SERPL-MCNC: 116 MG/DL (ref 65–99)
HCT VFR BLD AUTO: 46.2 % (ref 36.5–49.3)
HDLC SERPL-MCNC: 42 MG/DL
HGB BLD-MCNC: 15.6 G/DL (ref 12–17)
IMM GRANULOCYTES # BLD AUTO: 0.02 THOUSAND/UL (ref 0–0.2)
IMM GRANULOCYTES NFR BLD AUTO: 0 % (ref 0–2)
LDLC SERPL CALC-MCNC: 124 MG/DL (ref 0–100)
LYMPHOCYTES # BLD AUTO: 1.7 THOUSANDS/ÂΜL (ref 0.6–4.47)
LYMPHOCYTES NFR BLD AUTO: 31 % (ref 14–44)
MCH RBC QN AUTO: 29.4 PG (ref 26.8–34.3)
MCHC RBC AUTO-ENTMCNC: 33.8 G/DL (ref 31.4–37.4)
MCV RBC AUTO: 87 FL (ref 82–98)
MONOCYTES # BLD AUTO: 0.63 THOUSAND/ÂΜL (ref 0.17–1.22)
MONOCYTES NFR BLD AUTO: 12 % (ref 4–12)
NEUTROPHILS # BLD AUTO: 2.9 THOUSANDS/ÂΜL (ref 1.85–7.62)
NEUTS SEG NFR BLD AUTO: 53 % (ref 43–75)
NONHDLC SERPL-MCNC: 159 MG/DL
NRBC BLD AUTO-RTO: 0 /100 WBCS
PLATELET # BLD AUTO: 234 THOUSANDS/UL (ref 149–390)
PMV BLD AUTO: 10.6 FL (ref 8.9–12.7)
POTASSIUM SERPL-SCNC: 5.2 MMOL/L (ref 3.5–5.3)
PROT SERPL-MCNC: 7.6 G/DL (ref 6.4–8.4)
PSA SERPL-MCNC: 0.73 NG/ML (ref 0–4)
RBC # BLD AUTO: 5.31 MILLION/UL (ref 3.88–5.62)
SODIUM SERPL-SCNC: 139 MMOL/L (ref 135–147)
TRIGL SERPL-MCNC: 173 MG/DL (ref ?–150)
TSH SERPL DL<=0.05 MIU/L-ACNC: 1.87 UIU/ML (ref 0.45–4.5)
WBC # BLD AUTO: 5.44 THOUSAND/UL (ref 4.31–10.16)

## 2025-07-03 PROCEDURE — 82306 VITAMIN D 25 HYDROXY: CPT

## 2025-07-03 PROCEDURE — 84443 ASSAY THYROID STIM HORMONE: CPT

## 2025-07-03 PROCEDURE — 36415 COLL VENOUS BLD VENIPUNCTURE: CPT

## 2025-07-03 PROCEDURE — 83036 HEMOGLOBIN GLYCOSYLATED A1C: CPT

## 2025-07-03 PROCEDURE — 85025 COMPLETE CBC W/AUTO DIFF WBC: CPT

## 2025-07-03 PROCEDURE — 80061 LIPID PANEL: CPT

## 2025-07-03 PROCEDURE — 80053 COMPREHEN METABOLIC PANEL: CPT

## 2025-07-03 PROCEDURE — G0103 PSA SCREENING: HCPCS

## 2025-07-04 LAB
EST. AVERAGE GLUCOSE BLD GHB EST-MCNC: 111 MG/DL
HBA1C MFR BLD: 5.5 %

## 2025-07-05 DIAGNOSIS — I10 ESSENTIAL HYPERTENSION: ICD-10-CM

## 2025-07-08 DIAGNOSIS — R09.81 NASAL CONGESTION: ICD-10-CM

## 2025-07-08 DIAGNOSIS — N52.9 ERECTILE DYSFUNCTION, UNSPECIFIED ERECTILE DYSFUNCTION TYPE: ICD-10-CM

## 2025-07-08 DIAGNOSIS — E78.00 PURE HYPERCHOLESTEROLEMIA: ICD-10-CM

## 2025-07-08 DIAGNOSIS — I10 ESSENTIAL HYPERTENSION: ICD-10-CM

## 2025-07-08 RX ORDER — FLUTICASONE PROPIONATE 50 MCG
2 SPRAY, SUSPENSION (ML) NASAL DAILY PRN
Qty: 48 G | Refills: 3 | Status: SHIPPED | OUTPATIENT
Start: 2025-07-08

## 2025-07-08 RX ORDER — TADALAFIL 20 MG/1
20 TABLET ORAL DAILY PRN
Qty: 20 TABLET | Refills: 5 | Status: SHIPPED | OUTPATIENT
Start: 2025-07-08

## 2025-07-08 RX ORDER — ATORVASTATIN CALCIUM 20 MG/1
20 TABLET, FILM COATED ORAL DAILY
Qty: 90 TABLET | Refills: 3 | Status: SHIPPED | OUTPATIENT
Start: 2025-07-08

## 2025-07-08 RX ORDER — HYDROCHLOROTHIAZIDE 25 MG/1
25 TABLET ORAL DAILY
Qty: 30 TABLET | Refills: 1 | Status: CANCELLED | OUTPATIENT
Start: 2025-07-08

## 2025-07-08 RX ORDER — IRBESARTAN 150 MG/1
150 TABLET ORAL DAILY
Qty: 90 TABLET | Refills: 3 | Status: SHIPPED | OUTPATIENT
Start: 2025-07-08

## 2025-07-08 RX ORDER — TADALAFIL 20 MG/1
20 TABLET ORAL DAILY PRN
Qty: 10 TABLET | Refills: 5 | Status: CANCELLED | OUTPATIENT
Start: 2025-07-08

## 2025-07-08 RX ORDER — FLUTICASONE PROPIONATE 50 MCG
2 SPRAY, SUSPENSION (ML) NASAL DAILY PRN
Qty: 48 G | Refills: 3 | Status: CANCELLED | OUTPATIENT
Start: 2025-07-08

## 2025-07-08 RX ORDER — ATORVASTATIN CALCIUM 20 MG/1
20 TABLET, FILM COATED ORAL DAILY
Qty: 90 TABLET | Refills: 3 | Status: CANCELLED | OUTPATIENT
Start: 2025-07-08

## 2025-07-08 RX ORDER — HYDROCHLOROTHIAZIDE 25 MG/1
25 TABLET ORAL DAILY
Qty: 90 TABLET | Refills: 3 | Status: SHIPPED | OUTPATIENT
Start: 2025-07-08